# Patient Record
Sex: FEMALE | Race: BLACK OR AFRICAN AMERICAN | NOT HISPANIC OR LATINO | Employment: FULL TIME | URBAN - METROPOLITAN AREA
[De-identification: names, ages, dates, MRNs, and addresses within clinical notes are randomized per-mention and may not be internally consistent; named-entity substitution may affect disease eponyms.]

---

## 2023-12-14 ENCOUNTER — TRANSFERRED RECORDS (OUTPATIENT)
Dept: HEALTH INFORMATION MANAGEMENT | Facility: CLINIC | Age: 23
End: 2023-12-14

## 2024-02-14 ENCOUNTER — TELEPHONE (OUTPATIENT)
Dept: ENDOCRINOLOGY | Facility: CLINIC | Age: 24
End: 2024-02-14
Payer: COMMERCIAL

## 2024-02-14 DIAGNOSIS — R13.10 DYSPHAGIA: ICD-10-CM

## 2024-02-14 DIAGNOSIS — Z90.3 H/O GASTRIC SLEEVE: Primary | ICD-10-CM

## 2024-02-14 NOTE — TELEPHONE ENCOUNTER
General Call    Contacts         Type Contact Phone/Fax    02/14/2024 04:51 PM CST Phone (Incoming) Petty Hutchinson (Self) [phone] (H)          Reason for Call:  Pt has vertical surgery in [place] 12/17/2023 and is having issues - she wants a 2nd option    W  Okay to leave a detailed message?: Yes at Other phone number:  [phone]

## 2024-02-15 ENCOUNTER — TELEPHONE (OUTPATIENT)
Dept: GASTROENTEROLOGY | Facility: CLINIC | Age: 24
End: 2024-02-15
Payer: COMMERCIAL

## 2024-02-15 ENCOUNTER — TELEPHONE (OUTPATIENT)
Dept: ENDOCRINOLOGY | Facility: CLINIC | Age: 24
End: 2024-02-15
Payer: COMMERCIAL

## 2024-02-15 NOTE — TELEPHONE ENCOUNTER
"Endoscopy Scheduling Screen    Have you had a positive Covid test in the last 14 days?  No    Are you active on MyChart?   No    What insurance is in the chart?  Other:  HP    Ordering/Referring Provider: MADELINE GAN   (If ordering provider performs procedure, schedule with ordering provider unless otherwise instructed. )    BMI: Estimated body mass index is 29.96 kg/m  as calculated from the following:    Height as of 8/15/13: 1.633 m (5' 4.3\").    Weight as of 8/15/13: 79.9 kg (176 lb 3.2 oz).     Sedation Ordered  moderate sedation.   If patient BMI > 50 do not schedule in [facility].    If patient BMI > 45 do not schedule at [facility].    Are you taking methadone or Suboxone?  No    Have you had difficulties, pain, or discomfort during past endoscopy procedures?  No    Are you taking any prescription medications for pain 3 or more times per week?   NO - No RN review required.    Do you have a history of malignant hyperthermia or adverse reaction to anesthesia?  No    (Females) Are you currently pregnant?   No     Have you been diagnosed or told you have pulmonary hypertension?   No    Do you have an LVAD?  No    Have you been told you have moderate to severe sleep apnea?  No    Have you been told you have COPD, asthma, or any other lung disease?  No    Do you have any heart conditions?  No     Have you ever had an organ transplant?   No    Have you ever had or are you awaiting a heart or lung transplant?   No    Have you had a stroke or transient ischemic attack (TIA aka \"mini stroke\" in the last 6 months?   No    Have you been diagnosed with or been told you have cirrhosis of the liver?   No    Are you currently on dialysis?   No    Do you need assistance transferring?   No    BMI: Estimated body mass index is 29.96 kg/m  as calculated from the following:    Height as of 8/15/13: 1.633 m (5' 4.3\").    Weight as of 8/15/13: 79.9 kg (176 lb 3.2 oz).     Is patients BMI > 40 and scheduling location [facility]?  No    Do you " take an injectable medication for weight loss or diabetes (excluding insulin)?  No    Do you take the medication Naltrexone?  No    Do you take blood thinners?  No       Prep   Are you currently on dialysis or do you have chronic kidney disease?  No    Do you have a diagnosis of diabetes?  No    Do you have a diagnosis of cystic fibrosis (CF)?  No    On a regular basis do you go 3 -5 days between bowel movements?  No    BMI > 40?  No    Preferred Pharmacy:  No Pharmacies Listed    Final Scheduling Details   Colonoscopy prep sent?  N/A    Procedure scheduled  Upper endoscopy (EGD)    Surgeon:  ELVIA     Date of procedure:  2/16/2024     Pre-OP / PAC:   No - Not required for this site.    Location  [facility] - Per order.    Sedation   Moderate Sedation - Per order.      Patient Reminders:   You will receive a call from a Nurse to review instructions and health history.  This assessment must be completed prior to your procedure.  Failure to complete the Nurse assessment may result in the procedure being cancelled.      On the day of your procedure, please designate an adult(s) who can drive you home stay with you for the next 24 hours. The medicines used in the exam will make you sleepy. You will not be able to drive.      You cannot take public transportation, ride share services, or non-medical taxi service without a responsible caregiver.  Medical transport services are allowed with the requirement that a responsible caregiver will receive you at your destination.  We require that drivers and caregivers are confirmed prior to your procedure.

## 2024-02-15 NOTE — TELEPHONE ENCOUNTER
Dr Seals reviewed chart, patient agreeable to upper endoscopy with Dr Seals. Scot Ray will call to schedule. Patient verbalized understanding

## 2024-02-15 NOTE — TELEPHONE ENCOUNTER
Pre visit planning completed.      Procedure details:    Patient scheduled for Upper endoscopy (EGD) on 2/16/2024.     Arrival time: 1100. Procedure time 1200    Pre op exam needed? N/A    Facility location: [facility]; [address] [place] [address], [address]rd Floor, [place] [address]    Sedation type: Conscious sedation     Indication for procedure:   H/O gastric sleeve [Z90.3]  - Primary      Dysphagia [R13.10]            Chart review:     Electronic implanted devices? No    Recent diagnosis of diverticulitis within the last 6 weeks? N/A    Diabetic? No    Diabetic medication HOLDING recommendations: (if applicable)  Oral diabetic medications: N/A  Diabetic injectables: N/A  Insulin: N/A      Medication review:    Anticoagulants? No    NSAIDS? No NSAID medications per patient's medication list.  RN will verify with pre-assessment call.    Other medication HOLDING recommendations:  N/A      Prep for procedure:     Prep instructions was not sent due to pt does not have myChart and procedure is scheduled for tomorrow     Diana Hitchcock RN  Endoscopy Procedure Pre Assessment RN  [phone] option 4

## 2024-02-15 NOTE — TELEPHONE ENCOUNTER
Pre assessment completed for upcoming procedure.   (Please see previous telephone encounter notes for complete details)      Procedure details:    Arrival time and facility location reviewed.    Pre op exam needed? N/A    Designated  policy reviewed. Instructed to have someone stay 6  hours post procedure.     COVID policy reviewed.      Medication review:    Medications reviewed. Please see supporting documentation below. Holding recommendations discussed (if applicable).       Prep for procedure:     Procedure prep instructions reviewed.        Any additional information needed:  N/A      Patient  verbalized understanding and had no questions or concerns at this time.      Diana Hitchcock RN  Endoscopy Procedure Pre Assessment RN  [phone] option 4

## 2024-02-15 NOTE — TELEPHONE ENCOUNTER
Dr Seals reviewed patient's records and would like to do an endoscopy tomorrow between 12 and 1 p.m. Spoke with the manager of the Endoscopy Department, Marques, and he okayed this time. Called patient to let her know to arrive at 11 a.m. and also that someone from Endoscopy would be calling her to ask her some questions as well.

## 2024-02-16 ENCOUNTER — HOSPITAL ENCOUNTER (OUTPATIENT)
Facility: CLINIC | Age: 24
Discharge: HOME | End: 2024-02-16
Attending: SURGERY | Admitting: SURGERY
Payer: COMMERCIAL

## 2024-02-16 VITALS
OXYGEN SATURATION: 96 % | RESPIRATION RATE: 16 BRPM | HEART RATE: 91 BPM | DIASTOLIC BLOOD PRESSURE: 66 MMHG | SYSTOLIC BLOOD PRESSURE: 113 MMHG

## 2024-02-16 LAB — UPPER GI ENDOSCOPY: NORMAL

## 2024-02-16 PROCEDURE — 99153 MOD SED SAME PHYS/QHP EA: CPT | Performed by: SURGERY

## 2024-02-16 PROCEDURE — G0500 MOD SEDAT ENDO SERVICE >5YRS: HCPCS | Performed by: SURGERY

## 2024-02-16 PROCEDURE — 250N000011 HC RX IP 250 OP 636: Performed by: SURGERY

## 2024-02-16 PROCEDURE — 43220 ESOPHAGOSCOPY BALLOON <30MM: CPT | Performed by: SURGERY

## 2024-02-16 RX ORDER — FENTANYL CITRATE 50 UG/ML
INJECTION, SOLUTION INTRAMUSCULAR; INTRAVENOUS PRN
Status: DISCONTINUED | OUTPATIENT
Start: 2024-02-16 | End: 2024-02-16 | Stop reason: HOSPADM

## 2024-02-16 ASSESSMENT — ACTIVITIES OF DAILY LIVING (ADL)
ADLS_ACUITY_SCORE: 33
ADLS_ACUITY_SCORE: 35

## 2024-02-16 NOTE — OR NURSING
Pt tolerated EGD with balloon dilatation , very well, under conscious sedation. Pt came to endoscopy with a nasal feeding tube that remained in place when scope was removed, with the assist of a raptor grasping device.Pt was tachycardic, in the 140's, at the endof the procedure. Pt HR in the 110s when she was returned to the recovery area. Pt sent to recovery on RA

## 2024-02-19 ENCOUNTER — PATIENT OUTREACH (OUTPATIENT)
Dept: ENDOCRINOLOGY | Facility: CLINIC | Age: 24
End: 2024-02-19
Payer: COMMERCIAL

## 2024-02-19 NOTE — PROGRESS NOTES
Spoke with patient regarding EGD follow up. Patient states she is to have another EGD in 2 weeks. Dr Seals contacted to advise on follow up plan.

## 2024-02-20 ENCOUNTER — CARE COORDINATION (OUTPATIENT)
Dept: ENDOCRINOLOGY | Facility: CLINIC | Age: 24
End: 2024-02-20
Payer: COMMERCIAL

## 2024-02-20 DIAGNOSIS — R13.10 DYSPHAGIA: Primary | ICD-10-CM

## 2024-02-20 DIAGNOSIS — Z90.3 H/O GASTRIC SLEEVE: ICD-10-CM

## 2024-02-20 NOTE — PROGRESS NOTES
Patient gave e-mail to obtain OR report from [place]:  [email or web address]    Patient gave permission to obtain report for continuation of care.

## 2024-02-22 ENCOUNTER — TELEPHONE (OUTPATIENT)
Dept: GASTROENTEROLOGY | Facility: CLINIC | Age: 24
End: 2024-02-22
Payer: COMMERCIAL

## 2024-02-22 ENCOUNTER — TELEPHONE (OUTPATIENT)
Dept: ENDOCRINOLOGY | Facility: CLINIC | Age: 24
End: 2024-02-22
Payer: COMMERCIAL

## 2024-02-22 NOTE — TELEPHONE ENCOUNTER
Screening questions completed 2/15    Final Scheduling Details   Colonoscopy prep sent?  N/A    Procedure scheduled  Upper endoscopy (EGD)    Surgeon:  ELVIA     Date of procedure:  3/13/24     Pre-OP / PAC:   No - Not required for this site.    Location  [facility] - Per order.    Sedation   Moderate Sedation - Per order.      Patient Reminders:   You will receive a call from a Nurse to review instructions and health history.  This assessment must be completed prior to your procedure.  Failure to complete the Nurse assessment may result in the procedure being cancelled.      On the day of your procedure, please designate an adult(s) who can drive you home stay with you for the next 24 hours. The medicines used in the exam will make you sleepy. You will not be able to drive.      You cannot take public transportation, ride share services, or non-medical taxi service without a responsible caregiver.  Medical transport services are allowed with the requirement that a responsible caregiver will receive you at your destination.  We require that drivers and caregivers are confirmed prior to your procedure.

## 2024-02-22 NOTE — TELEPHONE ENCOUNTER
Left VM message asking patient to call me to discuss days for upper endoscopy with Dr. Seals. My direct call back number left. Last endoscopy 2/16 in [place] OR.

## 2024-03-01 ENCOUNTER — TELEPHONE (OUTPATIENT)
Dept: GASTROENTEROLOGY | Facility: CLINIC | Age: 24
End: 2024-03-01
Payer: COMMERCIAL

## 2024-03-01 NOTE — TELEPHONE ENCOUNTER
Attempted to contact patient in order to complete pre assessment questions.     No answer. Left message to return call to [phone] option 4    Missed call communication sent via letter.      Procedure details:    Patient scheduled for Upper endoscopy (EGD) on 3.13.24.     Arrival time: 1130. Procedure time 1230    Pre op exam needed? N/A    Facility location: [facility]; [address] [place] [address], [address] Floor, [place] [id number]    Sedation type: Conscious sedation     Indication for procedure:     gastric stricture         Chart review:     Electronic implanted devices? No    Recent diagnosis of diverticulitis within the last 6 weeks? N/A    Diabetic? No    Medication review:    Anticoagulants? No    NSAIDS? No    Other medication HOLDING recommendations:  N/A      Prep for procedure:     Prep instructions sent via letter.     Sylvie Felipe RN  Endoscopy Procedure Pre Assessment RN

## 2024-03-06 NOTE — TELEPHONE ENCOUNTER
Second call attempt to complete pre assessment.     No answer.  Left message to return call to [phone] #4 by next business day prior to 4PM or procedure will be sent to cancel.         Sylvie Felipe RN  Endoscopy Procedure Pre Assessment RN

## 2024-03-07 NOTE — TELEPHONE ENCOUNTER
Pre assessment completed for upcoming procedure.   (Please see previous telephone encounter notes for complete details)    Patient  returned call.       Procedure details:    Arrival time and facility location reviewed.    Pre op exam needed? N/A    Designated  policy reviewed. Instructed to have someone stay 6  hours post procedure.       Medication review:    Medications reviewed. Please see supporting documentation below. Holding recommendations discussed (if applicable).       Prep for procedure:     Procedure prep instructions reviewed.        Any additional information needed:  N/A      Patient  verbalized understanding and had no questions or concerns at this time.      Sylvie Felipe RN  Endoscopy Procedure Pre Assessment RN  [phone] option 4

## 2024-03-13 ENCOUNTER — HOSPITAL ENCOUNTER (OUTPATIENT)
Facility: CLINIC | Age: 24
Discharge: HOME | End: 2024-03-13
Attending: SURGERY | Admitting: SURGERY
Payer: COMMERCIAL

## 2024-03-13 VITALS
OXYGEN SATURATION: 96 % | DIASTOLIC BLOOD PRESSURE: 82 MMHG | SYSTOLIC BLOOD PRESSURE: 126 MMHG | HEART RATE: 89 BPM | RESPIRATION RATE: 18 BRPM

## 2024-03-13 LAB — UPPER GI ENDOSCOPY: NORMAL

## 2024-03-13 PROCEDURE — 43249 ESOPH EGD DILATION <30 MM: CPT | Performed by: SURGERY

## 2024-03-13 PROCEDURE — 250N000011 HC RX IP 250 OP 636: Performed by: SURGERY

## 2024-03-13 PROCEDURE — 250N000009 HC RX 250: Performed by: SURGERY

## 2024-03-13 PROCEDURE — G0500 MOD SEDAT ENDO SERVICE >5YRS: HCPCS | Performed by: SURGERY

## 2024-03-13 RX ORDER — FLUMAZENIL 0.1 MG/ML
0.2 INJECTION, SOLUTION INTRAVENOUS
Status: DISCONTINUED | OUTPATIENT
Start: 2024-03-13 | End: 2024-03-14 | Stop reason: HOSPADM

## 2024-03-13 RX ORDER — NALOXONE HYDROCHLORIDE 0.4 MG/ML
0.2 INJECTION, SOLUTION INTRAMUSCULAR; INTRAVENOUS; SUBCUTANEOUS
Status: DISCONTINUED | OUTPATIENT
Start: 2024-03-13 | End: 2024-03-18 | Stop reason: HOSPADM

## 2024-03-13 RX ORDER — NALOXONE HYDROCHLORIDE 0.4 MG/ML
0.4 INJECTION, SOLUTION INTRAMUSCULAR; INTRAVENOUS; SUBCUTANEOUS
Status: DISCONTINUED | OUTPATIENT
Start: 2024-03-13 | End: 2024-03-18 | Stop reason: HOSPADM

## 2024-03-13 RX ORDER — FENTANYL CITRATE 50 UG/ML
INJECTION, SOLUTION INTRAMUSCULAR; INTRAVENOUS PRN
Status: DISCONTINUED | OUTPATIENT
Start: 2024-03-13 | End: 2024-03-18 | Stop reason: HOSPADM

## 2024-03-13 RX ORDER — ONDANSETRON 2 MG/ML
4 INJECTION INTRAMUSCULAR; INTRAVENOUS EVERY 6 HOURS PRN
Status: DISCONTINUED | OUTPATIENT
Start: 2024-03-13 | End: 2024-03-18 | Stop reason: HOSPADM

## 2024-03-13 RX ORDER — ONDANSETRON 4 MG/1
4 TABLET, ORALLY DISINTEGRATING ORAL EVERY 6 HOURS PRN
Status: DISCONTINUED | OUTPATIENT
Start: 2024-03-13 | End: 2024-03-18 | Stop reason: HOSPADM

## 2024-03-13 RX ORDER — PROCHLORPERAZINE MALEATE 5 MG
10 TABLET ORAL EVERY 6 HOURS PRN
Status: DISCONTINUED | OUTPATIENT
Start: 2024-03-13 | End: 2024-03-18 | Stop reason: HOSPADM

## 2024-03-13 RX ADMIN — ONDANSETRON 4 MG: 2 INJECTION INTRAMUSCULAR; INTRAVENOUS at 13:21

## 2024-03-13 ASSESSMENT — ACTIVITIES OF DAILY LIVING (ADL)
ADLS_ACUITY_SCORE: 35

## 2024-03-13 NOTE — H&P
[facility] Anesthesia Pre-op History and Physical    Petty Hutchinson MRN# [id number]   Age: 23 year old YOB: 2000                    Location [facility] [place]; 3rd floor endoscopy center          Primary care provider: Marjorie Knowles         Chief Complaint and/or Reason for Procedure:   Has known sleeve stricture         Active problem list:     Patient Active Problem List    Diagnosis Date Noted    Obesity 08/16/2013     Priority: Medium            Medications (include herbals and vitamins):     No current facility-administered medications for this encounter.             Allergies:    No Known Allergies           Physical Exam:   Patient Vitals for the past 8 hrs:   BP Resp SpO2   03/13/24 1215 119/68 15 95 %     No intake/output data recorded.  Breathing unlabored  NAD            Lab / Radiology Results:   Guidelines for CXR: new or unstable cardio-pulmonary disease         Anesthetic risk and/or ASA classification:       Jerome Seals MD     Plan    Upper Endoscopy to evaluate for stricture; treat stricture      Jerome Seals MD  Surgery  [phone] (hospital )  [phone] (clinic nurses)

## 2024-03-13 NOTE — LETTER
[facility] ENDOSCOPY  [address] [facility] [address]  [phone]          March 1, 2024    Petty Hutchinson                                                                                                                     [address]  [facility] [address]            Dear Petty,        Petty,     We apologize that we have been unable to contact you by phone. We are calling in regards to your upcoming Upper endoscopy (EGD) procedure that is scheduled on 3.13.24 to complete pre assessment.    Please contact our pre assessment nursing team at [phone] option 4. We are open Monday through Friday, 7:00am to 5:00pm. Note that failure to complete Nurse assessment phone call will result in your procedure being cancelled.     Our schedules fill up quickly and are in high demand. If you know that you need to cancel, please contact us so that we can accommodate scheduling for other patients. Our endoscopy scheduling team can be reached at [phone] option 2.       Instructions for Your Upper Endoscopy  Pre-Assessment Phone Number: [facility]; [phone] option 4      You will receive a call from a Nurse to review instructions and health history.  This assessment must be completed prior to your procedure.  Failure to complete the Nurse assessment phone call may result in the procedure being cancelled.    On the day of your procedure, please designatean adult(s) who can drive you home stay with you for the next 24 hours. The medicines used in the exam will make you sleepy. You will not be able to drive.    You cannot take public transportation, ride share services, or non-medical taxi service without a responsible caregiver.  Medical transport services are allowed with the requirement that a responsible caregiver will receive you at your destination.  We require that drivers and caregivers are confirmed prior to your procedure.        What is an upper endoscopy?  - This is an  exam that checks for problems linked to heartburn, swallowing or belly (abdominal) pain or other symptoms of the upper GI tract. Depending on your symptoms, the doctor will look at your esophagus (food pipe), stomach and the part of the stomach that enters the small intestine.     Getting ready  - Dress in comfortable, loose clothing.  - Bring your insurance card. Leave your purse, billfold, credit cards and other valuables at home.  - Bring a list of your medicines and known allergies. If you have a pacemaker or ICD, please bring your information card.  - We do our best to stay on time, but there may be a delay. Please bring something to pass the time, such as a newspaper or book.    - Important: You must complete all steps before the exam.       Seven days before the exam   - Talk to your doctor: If you take blood-thinners (such as Coumadin, Plavix, Xarelto), your prescription or schedule may need to change before the test.  - Talk to your prescribing provider: If you take prescription NSAIDS (such as Sulindac, Celebrex, Mobic, Relafen). Your prescribing provider will tell you what to do.   - Continue taking prescribed aspirin; talk to your prescribing doctor with any concerns.    - If you have diabetes: Ask to have your exam early in the morning. Also, ask your doctor if you should change your diet or medicines    One day before the exam   - Stop eating all solid foods 8 hours prior to arrival time. You may drink clear liquids.   **If you have achalasia (treated or untreated) or gastroparesis, please be on a clear liquid diet for 24 hours prior to your exam and stop drinking all liquids at midnight.   - Stop taking sucralfate medication.  - Stop taking NSAID pain relievers, such as Advil, Ibuprofen, Motrin, etc.  You may take Tylenol.    Day of the exam  - You may drink clear liquids until 2 hours before your arrival time.  - You may take all of your morning medicines (except for diabetes pills) as usual with 4  oz. of water up to 2 hours before your arrival time.  - If you take diabetes medicine (pills): do not take them the morning of your test.  - Bring a list of your medicines and known allergies.  - Please do not wear jewelry (i.e. earrings, rings, necklaces, watches, etc) . Leave your purse, billfold, credit cards, and other valuables at home.   - Please arrive with an adult who can take you home after the test: The medicine will make you sleepy. If you do not have a , we may cancel your test.     What are clear liquids?   You may have:  - Water, tea, coffee (no cream)  - Soda pop, Gatorade   - Clear nutrition drinks (Enlive, Resource Breeze)   - Jell-O, Popsicles (no milk or fruit pieces) or sorbet   - Fat-free soup broth or bouillon  - Plain hard cand, such as clear life savers   - Clear juices and fruit-flavored drinks such as apple juice, white grape juice, Hi-C and Antonio-Aid    Do not have:  - Milk or milk products such as ice cream, malts or shakes  - Juices with pulp such as orange, grapefruit, pineapple or tomato juice  - Cream soups of any kind  - Alcohol       During the exam  - The exam lasts from 10 to 20 minutes.   - We will review the risks and benefits of the exam. We will then ask you to sign a consent form.  - We will place a small needle (IV) in your hand or arm. We will give you medicine through the IV to keep you comfortable.   - The endoscope will be advanced through your mouth and the exam completed.  - When we put air into your stomach, you may feel full or have mild cramps. We will remove the air at the end of the exam.  - To reduce discomfort, breathe at a slow, even pace. Try to relax the muscles in your neck and shoulders.  - We may take a small piece of tissue (a biopsy) to test in the lab. It will not hurt. We may also take pictures of your insides.     After the exam  - You will rest in the recovery area until you feel ready to leave. This takes about 30 to 60 minutes.   - We will  remove the IV.  - You may burp up air left in your stomach.  - You may feel drowsy or a little dizzy from the medicine.   - Your doctor will discuss your results. You will receive your test results in 7 to 10 business days by letter or MyChart.   - Your throat may feel numb. One hour after the exam, swallow a small amount of cool water. If you can swallow easily, you may go back to your regular diet and medicines.  - Your throat may be sore for the rest of the day. Throat lozenges or ice chips may help.  - Do not drive for 24 hours.    Call us at once if you have:  - Unusual pain or problems swallowing, unusual stomach or chest pain.  - Vomit that looks like coffee grounds or black or bloody stools (bowel movements).  - A fever above 100.6  F (37.5  C) when taken under the tongue.    Test results  - You will receive your results in 7 to 10 business days by phone, letter or MyChart.    For questions or appointments, call:  [facility] Endoscopy   [phone], option 2  Monday through Friday, 7 a.m. to 5 p.m.  (If it is after hours please reach out to the clinic or provider you were scheduled with.)    You should be aware that your endoscopist may be a part of a study to improve endoscopy procedures.  As part of a study, pictures gathered from your procedure may be stored and analyzed in a de-identified manner.

## 2024-03-13 NOTE — OR NURSING
P{t tolerated EGD with dilatation to 19, well, under conscious sedation. Pt SaO2 did drop to the mid 60's, very briefly, early in the procedure. Narcan was removed, not given. O2 increased to  6 lpm and pt had no further desaturation.  Pt using 2 lpm at the end of the procedure. Pt returned to recovery on Ra

## 2024-03-14 ENCOUNTER — PATIENT OUTREACH (OUTPATIENT)
Dept: ENDOCRINOLOGY | Facility: CLINIC | Age: 24
End: 2024-03-14
Payer: COMMERCIAL

## 2024-03-14 NOTE — PROGRESS NOTES
Checked in on patient post upper endoscopy to get status report.   Patient feels like her fluids are going down fine but when she ate ground turkey it got stuck, then went down.   Plan to have patient try to eat 2 meals today and drink 48 ounces of fluid and will check on her tomorrow.   Dr Seals updated on above.

## 2024-03-21 ENCOUNTER — TELEPHONE (OUTPATIENT)
Dept: SURGERY | Facility: CLINIC | Age: 24
End: 2024-03-21
Payer: COMMERCIAL

## 2024-03-21 DIAGNOSIS — R13.10 DYSPHAGIA, UNSPECIFIED TYPE: Primary | ICD-10-CM

## 2024-03-21 RX ORDER — CEFAZOLIN SODIUM 2 G/50ML
2 SOLUTION INTRAVENOUS SEE ADMIN INSTRUCTIONS
Status: CANCELLED | OUTPATIENT
Start: 2024-03-21

## 2024-03-21 RX ORDER — CEFAZOLIN SODIUM 2 G/50ML
2 SOLUTION INTRAVENOUS
Status: CANCELLED | OUTPATIENT
Start: 2024-03-21

## 2024-03-21 NOTE — TELEPHONE ENCOUNTER
Called patient to discuss stent placement on 3/26 at 11:50 a.m. at [facility] with Dr. Seals, to arrive at 9:50 a.m.    I did tell her I would talk with her next week after she has the stent placement to discuss date for stent removal.

## 2024-03-25 ENCOUNTER — TELEPHONE (OUTPATIENT)
Dept: ENDOCRINOLOGY | Facility: CLINIC | Age: 24
End: 2024-03-25
Payer: COMMERCIAL

## 2024-03-25 NOTE — TELEPHONE ENCOUNTER
Left VM message asking patient if she could come earlier tomorrow for her endoscopy as a patient fell off today. My direct call back number left.

## 2024-03-26 ENCOUNTER — ANESTHESIA (OUTPATIENT)
Dept: SURGERY | Facility: CLINIC | Age: 24
End: 2024-03-26
Payer: COMMERCIAL

## 2024-03-26 ENCOUNTER — ANESTHESIA EVENT (OUTPATIENT)
Dept: SURGERY | Facility: CLINIC | Age: 24
End: 2024-03-26
Payer: COMMERCIAL

## 2024-03-26 ENCOUNTER — HOSPITAL ENCOUNTER (OUTPATIENT)
Facility: CLINIC | Age: 24
Discharge: HOME | End: 2024-03-26
Attending: SURGERY | Admitting: SURGERY
Payer: COMMERCIAL

## 2024-03-26 VITALS
TEMPERATURE: 98 F | HEART RATE: 84 BPM | OXYGEN SATURATION: 99 % | DIASTOLIC BLOOD PRESSURE: 87 MMHG | RESPIRATION RATE: 18 BRPM | WEIGHT: 259.48 LBS | BODY MASS INDEX: 37.15 KG/M2 | HEIGHT: 70 IN | SYSTOLIC BLOOD PRESSURE: 143 MMHG

## 2024-03-26 DIAGNOSIS — R13.10 DYSPHAGIA, UNSPECIFIED TYPE: ICD-10-CM

## 2024-03-26 PROCEDURE — 710N000012 HC RECOVERY PHASE 2, PER MINUTE: Performed by: SURGERY

## 2024-03-26 PROCEDURE — 43266 EGD ENDOSCOPIC STENT PLACE: CPT | Performed by: ANESTHESIOLOGY

## 2024-03-26 PROCEDURE — 250N000009 HC RX 250

## 2024-03-26 PROCEDURE — 43266 EGD ENDOSCOPIC STENT PLACE: CPT

## 2024-03-26 PROCEDURE — 250N000025 HC SEVOFLURANE, PER MIN: Performed by: SURGERY

## 2024-03-26 PROCEDURE — 999N000141 HC STATISTIC PRE-PROCEDURE NURSING ASSESSMENT: Performed by: SURGERY

## 2024-03-26 PROCEDURE — C1874 STENT, COATED/COV W/DEL SYS: HCPCS | Performed by: SURGERY

## 2024-03-26 PROCEDURE — 258N000003 HC RX IP 258 OP 636

## 2024-03-26 PROCEDURE — 710N000010 HC RECOVERY PHASE 1, LEVEL 2, PER MIN: Performed by: SURGERY

## 2024-03-26 PROCEDURE — 250N000011 HC RX IP 250 OP 636: Performed by: ANESTHESIOLOGY

## 2024-03-26 PROCEDURE — 272N000001 HC OR GENERAL SUPPLY STERILE: Performed by: SURGERY

## 2024-03-26 PROCEDURE — 93005 ELECTROCARDIOGRAM TRACING: CPT

## 2024-03-26 PROCEDURE — 93010 ELECTROCARDIOGRAM REPORT: CPT | Performed by: INTERNAL MEDICINE

## 2024-03-26 PROCEDURE — 250N000013 HC RX MED GY IP 250 OP 250 PS 637: Performed by: ANESTHESIOLOGY

## 2024-03-26 PROCEDURE — 250N000011 HC RX IP 250 OP 636

## 2024-03-26 PROCEDURE — 370N000017 HC ANESTHESIA TECHNICAL FEE, PER MIN: Performed by: SURGERY

## 2024-03-26 PROCEDURE — C1769 GUIDE WIRE: HCPCS | Performed by: SURGERY

## 2024-03-26 PROCEDURE — 43266 EGD ENDOSCOPIC STENT PLACE: CPT | Performed by: SURGERY

## 2024-03-26 PROCEDURE — 360N000075 HC SURGERY LEVEL 2, PER MIN: Performed by: SURGERY

## 2024-03-26 DEVICE — STENT ESOPHAGEAL ENDOMAXX 23X150MM MAXX-2315
Type: IMPLANTABLE DEVICE | Site: STOMACH | Status: NON-FUNCTIONAL
Removed: 2024-03-29

## 2024-03-26 DEVICE — STENT ESOPHAGEAL ENDOMAXX 23X100MM MAXX-2310
Type: IMPLANTABLE DEVICE | Site: STOMACH | Status: NON-FUNCTIONAL
Removed: 2024-03-29

## 2024-03-26 RX ORDER — DEXAMETHASONE SODIUM PHOSPHATE 4 MG/ML
INJECTION, SOLUTION INTRA-ARTICULAR; INTRALESIONAL; INTRAMUSCULAR; INTRAVENOUS; SOFT TISSUE PRN
Status: DISCONTINUED | OUTPATIENT
Start: 2024-03-26 | End: 2024-03-26

## 2024-03-26 RX ORDER — OXYCODONE HYDROCHLORIDE 5 MG/1
5 TABLET ORAL
Status: DISCONTINUED | OUTPATIENT
Start: 2024-03-26 | End: 2024-03-26 | Stop reason: HOSPADM

## 2024-03-26 RX ORDER — MAGNESIUM HYDROXIDE/ALUMINUM HYDROXICE/SIMETHICONE 120; 1200; 1200 MG/30ML; MG/30ML; MG/30ML
30 SUSPENSION ORAL EVERY 4 HOURS PRN
Status: DISCONTINUED | OUTPATIENT
Start: 2024-03-26 | End: 2024-03-26 | Stop reason: HOSPADM

## 2024-03-26 RX ORDER — NALOXONE HYDROCHLORIDE 0.4 MG/ML
0.1 INJECTION, SOLUTION INTRAMUSCULAR; INTRAVENOUS; SUBCUTANEOUS
Status: DISCONTINUED | OUTPATIENT
Start: 2024-03-26 | End: 2024-03-26 | Stop reason: HOSPADM

## 2024-03-26 RX ORDER — HYDROMORPHONE HCL IN WATER/PF 6 MG/30 ML
0.2 PATIENT CONTROLLED ANALGESIA SYRINGE INTRAVENOUS EVERY 5 MIN PRN
Status: DISCONTINUED | OUTPATIENT
Start: 2024-03-26 | End: 2024-03-26 | Stop reason: HOSPADM

## 2024-03-26 RX ORDER — ONDANSETRON 4 MG/1
4 TABLET, ORALLY DISINTEGRATING ORAL EVERY 30 MIN PRN
Status: DISCONTINUED | OUTPATIENT
Start: 2024-03-26 | End: 2024-03-26 | Stop reason: HOSPADM

## 2024-03-26 RX ORDER — SODIUM CHLORIDE, SODIUM LACTATE, POTASSIUM CHLORIDE, CALCIUM CHLORIDE 600; 310; 30; 20 MG/100ML; MG/100ML; MG/100ML; MG/100ML
INJECTION, SOLUTION INTRAVENOUS CONTINUOUS
Status: DISCONTINUED | OUTPATIENT
Start: 2024-03-26 | End: 2024-03-26 | Stop reason: HOSPADM

## 2024-03-26 RX ORDER — LIDOCAINE HYDROCHLORIDE 20 MG/ML
INJECTION, SOLUTION INFILTRATION; PERINEURAL PRN
Status: DISCONTINUED | OUTPATIENT
Start: 2024-03-26 | End: 2024-03-26

## 2024-03-26 RX ORDER — HYDROMORPHONE HCL IN WATER/PF 6 MG/30 ML
0.4 PATIENT CONTROLLED ANALGESIA SYRINGE INTRAVENOUS EVERY 5 MIN PRN
Status: DISCONTINUED | OUTPATIENT
Start: 2024-03-26 | End: 2024-03-26 | Stop reason: HOSPADM

## 2024-03-26 RX ORDER — ONDANSETRON 2 MG/ML
INJECTION INTRAMUSCULAR; INTRAVENOUS PRN
Status: DISCONTINUED | OUTPATIENT
Start: 2024-03-26 | End: 2024-03-26

## 2024-03-26 RX ORDER — FENTANYL CITRATE 50 UG/ML
INJECTION, SOLUTION INTRAMUSCULAR; INTRAVENOUS PRN
Status: DISCONTINUED | OUTPATIENT
Start: 2024-03-26 | End: 2024-03-26

## 2024-03-26 RX ORDER — CEFAZOLIN SODIUM/WATER 2 G/20 ML
2 SYRINGE (ML) INTRAVENOUS
Status: DISCONTINUED | OUTPATIENT
Start: 2024-03-26 | End: 2024-03-26 | Stop reason: HOSPADM

## 2024-03-26 RX ORDER — CEFAZOLIN SODIUM/WATER 2 G/20 ML
2 SYRINGE (ML) INTRAVENOUS SEE ADMIN INSTRUCTIONS
Status: DISCONTINUED | OUTPATIENT
Start: 2024-03-26 | End: 2024-03-26 | Stop reason: HOSPADM

## 2024-03-26 RX ORDER — OXYCODONE HYDROCHLORIDE 10 MG/1
10 TABLET ORAL
Status: DISCONTINUED | OUTPATIENT
Start: 2024-03-26 | End: 2024-03-26 | Stop reason: HOSPADM

## 2024-03-26 RX ORDER — SODIUM CHLORIDE, SODIUM LACTATE, POTASSIUM CHLORIDE, CALCIUM CHLORIDE 600; 310; 30; 20 MG/100ML; MG/100ML; MG/100ML; MG/100ML
INJECTION, SOLUTION INTRAVENOUS CONTINUOUS PRN
Status: DISCONTINUED | OUTPATIENT
Start: 2024-03-26 | End: 2024-03-26

## 2024-03-26 RX ORDER — ONDANSETRON 2 MG/ML
4 INJECTION INTRAMUSCULAR; INTRAVENOUS EVERY 30 MIN PRN
Status: DISCONTINUED | OUTPATIENT
Start: 2024-03-26 | End: 2024-03-26 | Stop reason: HOSPADM

## 2024-03-26 RX ORDER — FENTANYL CITRATE 50 UG/ML
25 INJECTION, SOLUTION INTRAMUSCULAR; INTRAVENOUS EVERY 5 MIN PRN
Status: DISCONTINUED | OUTPATIENT
Start: 2024-03-26 | End: 2024-03-26 | Stop reason: HOSPADM

## 2024-03-26 RX ORDER — PROPOFOL 10 MG/ML
INJECTION, EMULSION INTRAVENOUS PRN
Status: DISCONTINUED | OUTPATIENT
Start: 2024-03-26 | End: 2024-03-26

## 2024-03-26 RX ORDER — LIDOCAINE 40 MG/G
CREAM TOPICAL
Status: DISCONTINUED | OUTPATIENT
Start: 2024-03-26 | End: 2024-03-26 | Stop reason: HOSPADM

## 2024-03-26 RX ORDER — FENTANYL CITRATE 50 UG/ML
50 INJECTION, SOLUTION INTRAMUSCULAR; INTRAVENOUS EVERY 5 MIN PRN
Status: DISCONTINUED | OUTPATIENT
Start: 2024-03-26 | End: 2024-03-26 | Stop reason: HOSPADM

## 2024-03-26 RX ADMIN — PHENYLEPHRINE HYDROCHLORIDE 100 MCG: 10 INJECTION INTRAVENOUS at 13:36

## 2024-03-26 RX ADMIN — SODIUM CHLORIDE, POTASSIUM CHLORIDE, SODIUM LACTATE AND CALCIUM CHLORIDE: 600; 310; 30; 20 INJECTION, SOLUTION INTRAVENOUS at 13:14

## 2024-03-26 RX ADMIN — SUGAMMADEX 200 MG: 100 INJECTION, SOLUTION INTRAVENOUS at 13:54

## 2024-03-26 RX ADMIN — PROCHLORPERAZINE EDISYLATE 5 MG: 5 INJECTION INTRAMUSCULAR; INTRAVENOUS at 14:49

## 2024-03-26 RX ADMIN — FENTANYL CITRATE 100 MCG: 50 INJECTION INTRAMUSCULAR; INTRAVENOUS at 13:21

## 2024-03-26 RX ADMIN — ONDANSETRON 4 MG: 2 INJECTION INTRAMUSCULAR; INTRAVENOUS at 14:30

## 2024-03-26 RX ADMIN — PROPOFOL 200 MG: 10 INJECTION, EMULSION INTRAVENOUS at 13:21

## 2024-03-26 RX ADMIN — LIDOCAINE HYDROCHLORIDE 100 MG: 20 INJECTION, SOLUTION INFILTRATION; PERINEURAL at 13:21

## 2024-03-26 RX ADMIN — ALUMINUM HYDROXIDE, MAGNESIUM HYDROXIDE, AND SIMETHICONE 30 ML: 1200; 120; 1200 SUSPENSION ORAL at 15:47

## 2024-03-26 RX ADMIN — HYDROMORPHONE HYDROCHLORIDE 0.4 MG: 0.2 INJECTION, SOLUTION INTRAMUSCULAR; INTRAVENOUS; SUBCUTANEOUS at 15:04

## 2024-03-26 RX ADMIN — SUGAMMADEX 100 MG: 100 INJECTION, SOLUTION INTRAVENOUS at 14:03

## 2024-03-26 RX ADMIN — DEXAMETHASONE SODIUM PHOSPHATE 4 MG: 4 INJECTION, SOLUTION INTRA-ARTICULAR; INTRALESIONAL; INTRAMUSCULAR; INTRAVENOUS; SOFT TISSUE at 13:27

## 2024-03-26 RX ADMIN — FENTANYL CITRATE 50 MCG: 50 INJECTION, SOLUTION INTRAMUSCULAR; INTRAVENOUS at 14:56

## 2024-03-26 RX ADMIN — Medication 50 MG: at 13:22

## 2024-03-26 RX ADMIN — MIDAZOLAM 2 MG: 1 INJECTION INTRAMUSCULAR; INTRAVENOUS at 13:13

## 2024-03-26 RX ADMIN — FENTANYL CITRATE 50 MCG: 50 INJECTION, SOLUTION INTRAMUSCULAR; INTRAVENOUS at 14:46

## 2024-03-26 RX ADMIN — ONDANSETRON 4 MG: 2 INJECTION INTRAMUSCULAR; INTRAVENOUS at 13:27

## 2024-03-26 ASSESSMENT — ACTIVITIES OF DAILY LIVING (ADL)
ADLS_ACUITY_SCORE: 33
ADLS_ACUITY_SCORE: 35

## 2024-03-26 NOTE — ANESTHESIA PROCEDURE NOTES
Airway       Patient location during procedure: OR       Procedure Start/Stop Times: 3/26/2024 1:24 PM  Staff -        CRNA: Jonathan Rascon APRN CRNA       Performed By: CRNA  Consent for Airway        Urgency: elective  Indications and Patient Condition       Indications for airway management: anand-procedural       Induction type:intravenous       Mask difficulty assessment: 1 - vent by mask    Final Airway Details       Final airway type: endotracheal airway       Successful airway: ETT - single  Endotracheal Airway Details        Cuffed: yes       Successful intubation technique: direct laryngoscopy       DL Blade Type: Goldman 2       Grade View of Cords: 1       Adjucts: stylet       Position: Right       Measured from: gums/teeth       Secured at (cm): 21       Bite block used: None    Post intubation assessment        Placement verified by: capnometry, equal breath sounds and chest rise        Number of attempts at approach: 1       Number of other approaches attempted: 0       Secured with: pink tape       Ease of procedure: easy       Dentition: Intact and Unchanged    Medication(s) Administered   Medication Administration Time: 3/26/2024 1:24 PM

## 2024-03-26 NOTE — ANESTHESIA CARE TRANSFER NOTE
Patient: Petty Hutchinson    Procedure: Procedure(s):  ESOPHAGOGASTRODUODENOSCOPY, WITH WIRE-GUIDED GASTRIC STENT PLACEMENT; [id number] DISTAL; [id number] PROXIMAL       Diagnosis: Dysphagia, unspecified type [R13.10]  Diagnosis Additional Information: No value filed.    Anesthesia Type:   General     Note:    Oropharynx: oropharynx clear of all foreign objects and spontaneously breathing  Level of Consciousness: awake  Oxygen Supplementation: face mask  Level of Supplemental Oxygen (L/min / FiO2): 6  Independent Airway: airway patency satisfactory and stable  Dentition: dentition unchanged  Vital Signs Stable: post-procedure vital signs reviewed and stable  Report to RN Given: handoff report given  Patient transferred to: PACU    Handoff Report: Identifed the Patient, Identified the Reponsible Provider, Reviewed the pertinent medical history, Discussed the surgical course, Reviewed Intra-OP anesthesia mangement and issues during anesthesia, Set expectations for post-procedure period and Allowed opportunity for questions and acknowledgement of understanding    Vitals:  Vitals Value Taken Time   /81 03/26/24 1415   Temp     Pulse 110 03/26/24 1417   Resp 21 03/26/24 1417   SpO2 100 % 03/26/24 1417   Vitals shown include unfiled device data.    Electronically Signed By: TOMY Wakefield CRNA  March 26, 2024  2:18 PM

## 2024-03-26 NOTE — ANESTHESIA PREPROCEDURE EVALUATION
"Anesthesia Pre-Procedure Evaluation    Patient: Petty Hutchinson   MRN: [id number] : 2000        Procedure : Procedure(s):  ESOPHAGOGASTRODUODENOSCOPY, WITH ESOPHAGEAL STENT REPLACEMENT          No past medical history on file.   No past surgical history on file.   No Known Allergies   Social History     Tobacco Use    Smoking status: Never    Smokeless tobacco: Not on file   Substance Use Topics    Alcohol use: No      Wt Readings from Last 1 Encounters:   08/15/13 79.9 kg (176 lb 3.2 oz) (>99%, Z= 2.43)*     * Growth percentiles are based on CDC (Girls, 2-20 Years) data.        Anesthesia Evaluation            ROS/MED HX  ENT/Pulmonary:  - neg pulmonary ROS     Neurologic:  - neg neurologic ROS     Cardiovascular:  - neg cardiovascular ROS     METS/Exercise Tolerance:     Hematologic:  - neg hematologic  ROS     Musculoskeletal:       GI/Hepatic: Comment: dysphagia      Renal/Genitourinary:  - neg Renal ROS     Endo:  - neg endo ROS   (+)               Obesity,       Psychiatric/Substance Use:  - neg psychiatric ROS     Infectious Disease:  - neg infectious disease ROS     Malignancy:  - neg malignancy ROS     Other:            Physical Exam    Airway             Respiratory Devices and Support         Dental       (+) Completely normal teeth      Cardiovascular          Rhythm and rate: regular and normal     Pulmonary           breath sounds clear to auscultation           OUTSIDE LABS:  CBC: No results found for: \"WBC\", \"HGB\", \"HCT\", \"PLT\"  BMP: No results found for: \"NA\", \"POTASSIUM\", \"CHLORIDE\", \"CO2\", \"BUN\", \"CR\", \"GLC\"  COAGS: No results found for: \"PTT\", \"INR\", \"FIBR\"  POC: No results found for: \"BGM\", \"HCG\", \"HCGS\"  HEPATIC: No results found for: \"ALBUMIN\", \"PROTTOTAL\", \"ALT\", \"AST\", \"GGT\", \"ALKPHOS\", \"BILITOTAL\", \"BILIDIRECT\", \"MARLA\"  OTHER: No results found for: \"PH\", \"LACT\", \"A1C\", \"JESE\", \"PHOS\", \"MAG\", \"LIPASE\", \"AMYLASE\", \"TSH\", \"T4\", \"T3\", \"CRP\", \"SED\"    Anesthesia Plan    ASA Status:  2  "   NPO Status:  NPO Appropriate    Anesthesia Type: General.     - Airway: ETT   Induction: Intravenous.   Maintenance: Balanced.        Consents    Anesthesia Plan(s) and associated risks, benefits, and realistic alternatives discussed. Questions answered and patient/representative(s) expressed understanding.     - Discussed:     - Discussed with:  Patient      - Extended Intubation/Ventilatory Support Discussed: No.      - Patient is DNR/DNI Status: No     Use of blood products discussed: No .     Postoperative Care    Pain management: IV analgesics, Multi-modal analgesia, Oral pain medications.   PONV prophylaxis: Ondansetron (or other 5HT-3), Dexamethasone or Solumedrol     Comments:               Aman Fermin MD    I have reviewed the pertinent notes and labs in the chart from the past 30 days and (re)examined the patient.  Any updates or changes from those notes are reflected in this note.

## 2024-03-26 NOTE — BRIEF OP NOTE
[facility]    Brief Operative Note    Pre-operative diagnosis: Dysphagia, unspecified type [R13.10]  Post-operative diagnosis Same as pre-operative diagnosis    Procedure: ESOPHAGOGASTRODUODENOSCOPY, WITH WIRE-GUIDED GASTRIC STENT PLACEMENT; [id number] DISTAL; [id number] PROXIMAL, N/A - Esophagus    Surgeon: Surgeon(s) and Role:     * Jerome Seals MD - Primary  Anesthesia: General   Estimated Blood Loss: Minimal    Drains: None  Specimens: * No specimens in log *  Findings:   Please see dictated operative report.  Complications: Please see dictated operative report.  Implants:   Implant Name Type Inv. Item Serial No.  Lot No. LRB No. Used Action   STENT ESOPHAGEAL ENDOMAXX [id number] BHAVESH-2315 - [id number] Stent STENT ESOPHAGEAL ENDOMAXX [id number] BHAVESH-2315  [employer] [id number] N/A 1 Implanted   STENT ESOPHAGEAL ENDOMAXX [id number] BHAVESH-2310 - [id number] Stent STENT ESOPHAGEAL ENDOMAXX [id number] BHAVESH-2310  [employer] [id number] N/A 1 Implanted         Zac Velasquez MD  Urology PGY-1

## 2024-03-26 NOTE — DISCHARGE INSTRUCTIONS
Contacting your Doctor -   To contact a doctor, call Dr Seals's clinic at [phone]  or:  [phone] and ask for the resident on call for Surgery (answered 24 hours a day)   Emergency Department:  [facility]: [phone]  [facility]: [phone] if you are in need of immediate or emergent help

## 2024-03-26 NOTE — ANESTHESIA POSTPROCEDURE EVALUATION
Patient: Petty Hutchinson    Procedure: Procedure(s):  ESOPHAGOGASTRODUODENOSCOPY, WITH WIRE-GUIDED GASTRIC STENT PLACEMENT; [id number] DISTAL; [id number] PROXIMAL       Anesthesia Type:  General    Note:  Anesthesia Post Evaluation    Last vitals:  Vitals Value Taken Time   /81 03/26/24 1415   Temp     Pulse 100 03/26/24 1450   Resp 20 03/26/24 1422   SpO2 100 % 03/26/24 1450   Vitals shown include unfiled device data.    Electronically Signed By: Aman Fermin MD  March 26, 2024  2:51 PM

## 2024-03-27 ENCOUNTER — APPOINTMENT (OUTPATIENT)
Dept: GENERAL RADIOLOGY | Facility: CLINIC | Age: 24
End: 2024-03-27
Attending: EMERGENCY MEDICINE
Payer: COMMERCIAL

## 2024-03-27 ENCOUNTER — HOSPITAL ENCOUNTER (OUTPATIENT)
Facility: CLINIC | Age: 24
Setting detail: OBSERVATION
Discharge: HOME | End: 2024-03-27
Attending: EMERGENCY MEDICINE | Admitting: SURGERY
Payer: COMMERCIAL

## 2024-03-27 ENCOUNTER — TELEPHONE (OUTPATIENT)
Dept: ENDOCRINOLOGY | Facility: CLINIC | Age: 24
End: 2024-03-27

## 2024-03-27 ENCOUNTER — APPOINTMENT (OUTPATIENT)
Dept: CT IMAGING | Facility: CLINIC | Age: 24
End: 2024-03-27
Attending: EMERGENCY MEDICINE
Payer: COMMERCIAL

## 2024-03-27 VITALS
OXYGEN SATURATION: 100 % | RESPIRATION RATE: 18 BRPM | SYSTOLIC BLOOD PRESSURE: 133 MMHG | TEMPERATURE: 98 F | DIASTOLIC BLOOD PRESSURE: 77 MMHG | HEART RATE: 77 BPM

## 2024-03-27 DIAGNOSIS — R10.13 EPIGASTRIC PAIN: Primary | ICD-10-CM

## 2024-03-27 DIAGNOSIS — G89.18 POST-OP PAIN: ICD-10-CM

## 2024-03-27 PROBLEM — R10.9 ABDOMINAL PAIN: Status: ACTIVE | Noted: 2024-03-27

## 2024-03-27 LAB
ABO/RH(D): NORMAL
ALBUMIN SERPL BCG-MCNC: 4.5 G/DL (ref 3.5–5.2)
ALP SERPL-CCNC: 88 U/L (ref 40–150)
ALT SERPL W P-5'-P-CCNC: 22 U/L (ref 0–50)
ANION GAP SERPL CALCULATED.3IONS-SCNC: 14 MMOL/L (ref 7–15)
ANTIBODY SCREEN: NEGATIVE
AST SERPL W P-5'-P-CCNC: 21 U/L (ref 0–45)
ATRIAL RATE - MUSE: 59 BPM
ATRIAL RATE - MUSE: 92 BPM
BASOPHILS # BLD AUTO: 0 10E3/UL (ref 0–0.2)
BASOPHILS NFR BLD AUTO: 0 %
BILIRUB SERPL-MCNC: 0.4 MG/DL
BUN SERPL-MCNC: 4.3 MG/DL (ref 6–20)
CALCIUM SERPL-MCNC: 9.8 MG/DL (ref 8.6–10)
CHLORIDE SERPL-SCNC: 103 MMOL/L (ref 98–107)
CREAT SERPL-MCNC: 0.75 MG/DL (ref 0.51–0.95)
DEPRECATED HCO3 PLAS-SCNC: 23 MMOL/L (ref 22–29)
DIASTOLIC BLOOD PRESSURE - MUSE: NORMAL MMHG
DIASTOLIC BLOOD PRESSURE - MUSE: NORMAL MMHG
EGFRCR SERPLBLD CKD-EPI 2021: >90 ML/MIN/1.73M2
EOSINOPHIL # BLD AUTO: 0 10E3/UL (ref 0–0.7)
EOSINOPHIL NFR BLD AUTO: 0 %
ERYTHROCYTE [DISTWIDTH] IN BLOOD BY AUTOMATED COUNT: 18.5 % (ref 10–15)
GLUCOSE BLDC GLUCOMTR-MCNC: 107 MG/DL (ref 70–99)
GLUCOSE SERPL-MCNC: 112 MG/DL (ref 70–99)
HCG SERPL QL: NEGATIVE
HCT VFR BLD AUTO: 40.3 % (ref 35–47)
HGB BLD-MCNC: 12.6 G/DL (ref 11.7–15.7)
HOLD SPECIMEN: NORMAL
IMM GRANULOCYTES # BLD: 0 10E3/UL
IMM GRANULOCYTES NFR BLD: 0 %
INTERPRETATION ECG - MUSE: NORMAL
INTERPRETATION ECG - MUSE: NORMAL
LYMPHOCYTES # BLD AUTO: 1.3 10E3/UL (ref 0.8–5.3)
LYMPHOCYTES NFR BLD AUTO: 12 %
MCH RBC QN AUTO: 25.5 PG (ref 26.5–33)
MCHC RBC AUTO-ENTMCNC: 31.3 G/DL (ref 31.5–36.5)
MCV RBC AUTO: 81 FL (ref 78–100)
MONOCYTES # BLD AUTO: 0.6 10E3/UL (ref 0–1.3)
MONOCYTES NFR BLD AUTO: 6 %
NEUTROPHILS # BLD AUTO: 8.9 10E3/UL (ref 1.6–8.3)
NEUTROPHILS NFR BLD AUTO: 82 %
NRBC # BLD AUTO: 0 10E3/UL
NRBC BLD AUTO-RTO: 0 /100
P AXIS - MUSE: 43 DEGREES
P AXIS - MUSE: 85 DEGREES
PLATELET # BLD AUTO: 308 10E3/UL (ref 150–450)
POTASSIUM SERPL-SCNC: 3.4 MMOL/L (ref 3.4–5.3)
PR INTERVAL - MUSE: 162 MS
PR INTERVAL - MUSE: 168 MS
PROT SERPL-MCNC: 8 G/DL (ref 6.4–8.3)
QRS DURATION - MUSE: 86 MS
QRS DURATION - MUSE: 90 MS
QT - MUSE: 414 MS
QT - MUSE: 422 MS
QTC - MUSE: 409 MS
QTC - MUSE: 521 MS
R AXIS - MUSE: 39 DEGREES
R AXIS - MUSE: 81 DEGREES
RBC # BLD AUTO: 4.95 10E6/UL (ref 3.8–5.2)
SODIUM SERPL-SCNC: 140 MMOL/L (ref 135–145)
SPECIMEN EXPIRATION DATE: NORMAL
SYSTOLIC BLOOD PRESSURE - MUSE: NORMAL MMHG
SYSTOLIC BLOOD PRESSURE - MUSE: NORMAL MMHG
T AXIS - MUSE: 164 DEGREES
T AXIS - MUSE: 21 DEGREES
VENTRICULAR RATE- MUSE: 59 BPM
VENTRICULAR RATE- MUSE: 92 BPM
WBC # BLD AUTO: 10.9 10E3/UL (ref 4–11)

## 2024-03-27 PROCEDURE — 86900 BLOOD TYPING SEROLOGIC ABO: CPT | Performed by: EMERGENCY MEDICINE

## 2024-03-27 PROCEDURE — 99285 EMERGENCY DEPT VISIT HI MDM: CPT | Mod: 25 | Performed by: EMERGENCY MEDICINE

## 2024-03-27 PROCEDURE — 36415 COLL VENOUS BLD VENIPUNCTURE: CPT | Performed by: EMERGENCY MEDICINE

## 2024-03-27 PROCEDURE — 71046 X-RAY EXAM CHEST 2 VIEWS: CPT | Mod: 26 | Performed by: RADIOLOGY

## 2024-03-27 PROCEDURE — 250N000013 HC RX MED GY IP 250 OP 250 PS 637

## 2024-03-27 PROCEDURE — 250N000011 HC RX IP 250 OP 636: Performed by: EMERGENCY MEDICINE

## 2024-03-27 PROCEDURE — 258N000003 HC RX IP 258 OP 636

## 2024-03-27 PROCEDURE — 80053 COMPREHEN METABOLIC PANEL: CPT | Performed by: EMERGENCY MEDICINE

## 2024-03-27 PROCEDURE — 96375 TX/PRO/DX INJ NEW DRUG ADDON: CPT | Mod: 59

## 2024-03-27 PROCEDURE — 258N000003 HC RX IP 258 OP 636: Performed by: EMERGENCY MEDICINE

## 2024-03-27 PROCEDURE — 82962 GLUCOSE BLOOD TEST: CPT

## 2024-03-27 PROCEDURE — 84703 CHORIONIC GONADOTROPIN ASSAY: CPT | Performed by: EMERGENCY MEDICINE

## 2024-03-27 PROCEDURE — G0378 HOSPITAL OBSERVATION PER HR: HCPCS

## 2024-03-27 PROCEDURE — 71260 CT THORAX DX C+: CPT

## 2024-03-27 PROCEDURE — 96376 TX/PRO/DX INJ SAME DRUG ADON: CPT | Mod: 59

## 2024-03-27 PROCEDURE — 71260 CT THORAX DX C+: CPT | Mod: 26 | Performed by: RADIOLOGY

## 2024-03-27 PROCEDURE — 93005 ELECTROCARDIOGRAM TRACING: CPT | Performed by: EMERGENCY MEDICINE

## 2024-03-27 PROCEDURE — 71046 X-RAY EXAM CHEST 2 VIEWS: CPT

## 2024-03-27 PROCEDURE — 250N000011 HC RX IP 250 OP 636

## 2024-03-27 PROCEDURE — 74177 CT ABD & PELVIS W/CONTRAST: CPT | Mod: 26 | Performed by: RADIOLOGY

## 2024-03-27 PROCEDURE — 85025 COMPLETE CBC W/AUTO DIFF WBC: CPT | Performed by: EMERGENCY MEDICINE

## 2024-03-27 PROCEDURE — 96374 THER/PROPH/DIAG INJ IV PUSH: CPT | Mod: 59 | Performed by: EMERGENCY MEDICINE

## 2024-03-27 PROCEDURE — 96361 HYDRATE IV INFUSION ADD-ON: CPT

## 2024-03-27 RX ORDER — ACETAMINOPHEN 325 MG/1
650 TABLET ORAL EVERY 4 HOURS
Qty: 90 TABLET | Refills: 0 | Status: SHIPPED | OUTPATIENT
Start: 2024-03-27

## 2024-03-27 RX ORDER — AMOXICILLIN 250 MG
2 CAPSULE ORAL 2 TIMES DAILY
Status: DISCONTINUED | OUTPATIENT
Start: 2024-03-27 | End: 2024-03-27 | Stop reason: HOSPADM

## 2024-03-27 RX ORDER — ONDANSETRON 2 MG/ML
4 INJECTION INTRAMUSCULAR; INTRAVENOUS EVERY 6 HOURS PRN
Status: DISCONTINUED | OUTPATIENT
Start: 2024-03-27 | End: 2024-03-27 | Stop reason: HOSPADM

## 2024-03-27 RX ORDER — AMOXICILLIN 250 MG
2 CAPSULE ORAL 2 TIMES DAILY PRN
Status: DISCONTINUED | OUTPATIENT
Start: 2024-03-27 | End: 2024-03-27 | Stop reason: HOSPADM

## 2024-03-27 RX ORDER — AMOXICILLIN 250 MG
1 CAPSULE ORAL 2 TIMES DAILY PRN
Status: DISCONTINUED | OUTPATIENT
Start: 2024-03-27 | End: 2024-03-27 | Stop reason: HOSPADM

## 2024-03-27 RX ORDER — ONDANSETRON 2 MG/ML
4 INJECTION INTRAMUSCULAR; INTRAVENOUS
Status: COMPLETED | OUTPATIENT
Start: 2024-03-27 | End: 2024-03-27

## 2024-03-27 RX ORDER — ONDANSETRON 4 MG/1
4 TABLET, ORALLY DISINTEGRATING ORAL EVERY 6 HOURS PRN
Qty: 28 TABLET | Refills: 0 | Status: SHIPPED | OUTPATIENT
Start: 2024-03-27

## 2024-03-27 RX ORDER — HYDROMORPHONE HCL IN WATER/PF 6 MG/30 ML
0.4 PATIENT CONTROLLED ANALGESIA SYRINGE INTRAVENOUS
Status: DISCONTINUED | OUTPATIENT
Start: 2024-03-27 | End: 2024-03-27 | Stop reason: HOSPADM

## 2024-03-27 RX ORDER — HYDROMORPHONE HYDROCHLORIDE 2 MG/1
2 TABLET ORAL EVERY 6 HOURS PRN
Qty: 20 TABLET | Refills: 0 | Status: SHIPPED | OUTPATIENT
Start: 2024-03-27 | End: 2024-04-01

## 2024-03-27 RX ORDER — AMOXICILLIN 250 MG
1 CAPSULE ORAL 2 TIMES DAILY
Status: DISCONTINUED | OUTPATIENT
Start: 2024-03-27 | End: 2024-03-27 | Stop reason: HOSPADM

## 2024-03-27 RX ORDER — ACETAMINOPHEN 325 MG/1
650 TABLET ORAL EVERY 4 HOURS PRN
Status: DISCONTINUED | OUTPATIENT
Start: 2024-03-27 | End: 2024-03-27 | Stop reason: HOSPADM

## 2024-03-27 RX ORDER — HYDROMORPHONE HYDROCHLORIDE 1 MG/ML
0.5 INJECTION, SOLUTION INTRAMUSCULAR; INTRAVENOUS; SUBCUTANEOUS EVERY 30 MIN PRN
Status: DISCONTINUED | OUTPATIENT
Start: 2024-03-27 | End: 2024-03-27 | Stop reason: HOSPADM

## 2024-03-27 RX ORDER — ONDANSETRON 4 MG/1
4 TABLET, ORALLY DISINTEGRATING ORAL EVERY 6 HOURS PRN
Status: DISCONTINUED | OUTPATIENT
Start: 2024-03-27 | End: 2024-03-27 | Stop reason: HOSPADM

## 2024-03-27 RX ORDER — BISACODYL 10 MG
SUPPOSITORY, RECTAL RECTAL
COMMUNITY
Start: 2024-01-23

## 2024-03-27 RX ORDER — SODIUM CHLORIDE, SODIUM LACTATE, POTASSIUM CHLORIDE, CALCIUM CHLORIDE 600; 310; 30; 20 MG/100ML; MG/100ML; MG/100ML; MG/100ML
INJECTION, SOLUTION INTRAVENOUS CONTINUOUS
Status: DISCONTINUED | OUTPATIENT
Start: 2024-03-27 | End: 2024-03-27

## 2024-03-27 RX ORDER — IOPAMIDOL 755 MG/ML
135 INJECTION, SOLUTION INTRAVASCULAR ONCE
Status: COMPLETED | OUTPATIENT
Start: 2024-03-27 | End: 2024-03-27

## 2024-03-27 RX ORDER — HYDROMORPHONE HCL IN WATER/PF 6 MG/30 ML
0.2 PATIENT CONTROLLED ANALGESIA SYRINGE INTRAVENOUS
Status: DISCONTINUED | OUTPATIENT
Start: 2024-03-27 | End: 2024-03-27 | Stop reason: HOSPADM

## 2024-03-27 RX ADMIN — SODIUM CHLORIDE 1000 ML: 9 INJECTION, SOLUTION INTRAVENOUS at 05:24

## 2024-03-27 RX ADMIN — ONDANSETRON 4 MG: 2 INJECTION INTRAMUSCULAR; INTRAVENOUS at 09:00

## 2024-03-27 RX ADMIN — DOCUSATE SODIUM 50 MG AND SENNOSIDES 8.6 MG 1 TABLET: 8.6; 5 TABLET, FILM COATED ORAL at 09:08

## 2024-03-27 RX ADMIN — HYDROMORPHONE HYDROCHLORIDE 0.4 MG: 0.2 INJECTION, SOLUTION INTRAMUSCULAR; INTRAVENOUS; SUBCUTANEOUS at 08:09

## 2024-03-27 RX ADMIN — ONDANSETRON 4 MG: 2 INJECTION INTRAMUSCULAR; INTRAVENOUS at 03:47

## 2024-03-27 RX ADMIN — SODIUM CHLORIDE, POTASSIUM CHLORIDE, SODIUM LACTATE AND CALCIUM CHLORIDE: 600; 310; 30; 20 INJECTION, SOLUTION INTRAVENOUS at 06:25

## 2024-03-27 RX ADMIN — IOPAMIDOL 135 ML: 755 INJECTION, SOLUTION INTRAVENOUS at 05:07

## 2024-03-27 RX ADMIN — HYDROMORPHONE HYDROCHLORIDE 0.5 MG: 1 INJECTION, SOLUTION INTRAMUSCULAR; INTRAVENOUS; SUBCUTANEOUS at 09:00

## 2024-03-27 ASSESSMENT — COLUMBIA-SUICIDE SEVERITY RATING SCALE - C-SSRS
1. IN THE PAST MONTH, HAVE YOU WISHED YOU WERE DEAD OR WISHED YOU COULD GO TO SLEEP AND NOT WAKE UP?: NO
2. HAVE YOU ACTUALLY HAD ANY THOUGHTS OF KILLING YOURSELF IN THE PAST MONTH?: NO
6. HAVE YOU EVER DONE ANYTHING, STARTED TO DO ANYTHING, OR PREPARED TO DO ANYTHING TO END YOUR LIFE?: NO

## 2024-03-27 ASSESSMENT — ACTIVITIES OF DAILY LIVING (ADL)
ADLS_ACUITY_SCORE: 35

## 2024-03-27 NOTE — OP NOTE
"[facility]    Operative Note    Pre-operative diagnosis: Dysphagia, unspecified type [R13.10]  Post-operative diagnosis Same as pre-operative diagnosis    Procedure: ESOPHAGOGASTRODUODENOSCOPY, WITH WIRE-GUIDED GASTRIC STENT PLACEMENT; [id number] DISTAL; [id number] PROXIMAL, N/A - Esophagus    Surgeon: Surgeon(s) and Role:     * Jerome Seals MD - Primary  Anesthesia: General   Estimated Blood Loss: Minimal    Drains: None  Specimens: * No specimens in log *  Findings:   Sleeve anatomy with narrowings at upper an lower aspect of sleeve.  Complications: none  Implants:   Implant Name Type Inv. Item Serial No.  Lot No. LRB No. Used Action   STENT ESOPHAGEAL ENDOMAXX [id number] BHAVESH-2315 - [id number] Stent STENT ESOPHAGEAL ENDOMAXX [id number] BHAVESH-2315  [employer] [id number] N/A 1 Implanted   STENT ESOPHAGEAL ENDOMAXX [id number] BHAVESH-2310 - [id number] Stent STENT ESOPHAGEAL ENDOMAXX [id number] BHAVESH-2310  [employer] [id number] N/A 1 Implanted           INDICATIONS FOR PROCEDURE  Petty Hutchinson is a 23 year old female who underwent prior sleeve gastrectomy surgery in [facility]; has had severe challenges with gastric stricture and has required nasojejunal feeding tube AND prior dilatations twice.    I reviewed options, which include stent for stricture; goal is to maintain stent for up to 2-4 weeks in order to allow stretching of the narrowed aspects of her sleeve.    There is a diagnosis of gastrointestinal stricture.    Height: 177.8 cm (5' 10\"), Weight: 117.7 kg (259 lb 7.7 oz), and currently the Body mass index is 37.23 kg/m .    After understanding the risks and benefits of proceeding with an esophageal stent placement, she agreed to an operation as outlined by me.    I reviewed the risks of surgery with Petty Hutchinson and these include but are not limited to the following:    POTENTIAL COMPLICATIONS  Complications have been reported in the " literature for esophageal stent placement with both silicone stents and expandable metal stents.     PROCEDURAL COMPLICATIONS:   Bleeding   Esophageal perforation   Pain   Aspiration    POST-STENT PLACEMENT COMPLICATIONS:   Stent migration   Perforation   Bleeding   Pain/foreign body sensation   Occlusion due to lesion growth   Obstruction related to food volume   Infection   Reflux   Esophagitis   Esophageal ulceration   Edema   Fever   Fistula formation outside of normal disease progression   Death with cause outside of normal disease progression    DESCRIPTION OF PROCEDURE:   The patient was brought to the operating room, placed supine on the operating room table, and general anesthesia was induced.  A timeout was conducted with all members of the surgical team present to verify that the procedure and patient were correct.  The procedure was started by intubating the esophagus with a 10-mm adult endoscope.   The scope was advanced into the gastric sleeve, which was noted to be strictured and the strictures were traversed.    I went into the duodenum, which was normal and left an Amplatz stiff wire there and the gastroscope was removed with the wire left in place.  Next, the stent described above was then placed into the esophagus and advancement over the wire was confirmed optically then with gastroscope.  The proximal flare was positioned in the proximal sleeve with distal flare deployed in the distal stomach just proximal to pylorus. The stent was deployed under optical guidance.  The stent was somewhat tortuous at end of deployment of the 150mm stent; hence, a 2nd 100mm stent was deployed inside the primary stent using wire guidance and optically confirmed positioning inside the sleeve lumen; this stent was deployed with distal flare inside the proximal portion of the 150mm stent with about 40mm of overlap.  Hence, the 100mm stent proximal flare was deployed on distal esophagus and gastroscope confirmed good  positioning of the tandem stents.  The gastroscope was removed and the procedure was complete.  I was present for all portions of the operation.    Jerome Seals MD  Surgery  [phone] (hospital )  [phone] (clinic nurses

## 2024-03-27 NOTE — ED PROVIDER NOTES
ED Provider Note  [facility]      History     Chief Complaint   Patient presents with    Nausea, Vomiting, & Diarrhea     Pt reports having esophageal stent placed at this hospital yesterday and has been vomiting since the procedure was done.  Pt reports she is vomiting blood, does not see blood clots or tissue in emesis.  Pt unsure if febrile at home.  Pt reports midsternal CP.       HPI  ePtty Hutchinson is a 23 year old female who presents to us with a chief complaint of hematemesis.  [place]  This resulted in esophageal strictures.  She had esophageal stent here placed today.  She had some vomiting afterwards with some streaks of blood.  She went home and is having more brock hematemesis.  No dizziness.  She is having some chest pain.    Past Medical History  No past medical history on file.  No past surgical history on file.  hyoscyamine (LEVSIN/SL) 0.125 MG sublingual tablet      No Known Allergies  Family History  No family history on file.  Social History   Social History     Tobacco Use    Smoking status: Never   Substance Use Topics    Alcohol use: No    Drug use: No         A medically appropriate review of systems was performed with pertinent positives and negatives noted in the HPI, and all other systems negative.    Physical Exam   BP: 127/88  Pulse: 82  Temp: 98.3  F (36.8  C)  Resp: 16  SpO2: 99 %  Physical Exam  Constitutional:       General: She is not in acute distress.     Appearance: She is not diaphoretic.   HENT:      Head: Normocephalic and atraumatic.      Right Ear: External ear normal.      Left Ear: External ear normal.      Nose: Nose normal.   Eyes:      Extraocular Movements: Extraocular movements intact.      Conjunctiva/sclera: Conjunctivae normal.   Cardiovascular:      Rate and Rhythm: Normal rate and regular rhythm.      Heart sounds: Normal heart sounds.   Pulmonary:      Effort: Pulmonary effort is  normal. No respiratory distress.      Breath sounds: Normal breath sounds.   Abdominal:      General: There is no distension.      Palpations: Abdomen is soft.      Tenderness: There is no abdominal tenderness.   Musculoskeletal:         General: No swelling or deformity.      Cervical back: Normal range of motion and neck supple.   Skin:     General: Skin is warm and dry.   Neurological:      Mental Status: Mental status is at baseline.      Comments: Alert, oriented   Psychiatric:         Mood and Affect: Mood normal.         Behavior: Behavior normal.         ED Course, Procedures, & Data      Procedures            EKG Interpretation:      Interpreted by Alexsander Faith DO  Time reviewed: 337  Symptoms at time of EKG: Vomiting  Rhythm: normal sinus   Rate: normal  Axis: normal  Ectopy: none  Conduction: normal  ST Segments/ T Waves: No ST-T wave changes  Q Waves: none  Comparison to prior: No old EKG available    Clinical Impression: normal EKG          Results for orders placed or performed during the hospital encounter of 03/27/24   XR Chest 2 Views     Status: None    Narrative    EXAM: CHEST 2 VIEWS  LOCATION: [facility]  DATE: 03/27/2024    INDICATION: Esophageal stent placement. Hematemesis.  COMPARISON: None.    FINDINGS: Partial visualization of a stent with its proximal aspect in the distal esophagus and distal portion extending inferior to the field-of-view in the upper to mid abdomen. The lungs are clear. Normal-sized cardiac silhouette.      Impression    IMPRESSION:   1.  No evidence of active cardiopulmonary disease.  2.  Partial visualization of a stent with its most proximal aspect in the distal esophagus and its distal aspect extending inferior to the field-of-view in the upper to mid abdomen.      CT Chest/Abdomen/Pelvis w Contrast     Status: None (Preliminary result)    Narrative    EXAM: CT CHEST, ABDOMEN AND PELVIS WITH CONTRAST  LOCATION:   [facility]  DATE/TIME: 03/27/2024, 5:15 AM CDT    INDICATION: Hematemesis and pain after placement of esophageal stent.  COMPARISON: None.    TECHNIQUE: CT scan of the chest, abdomen, and pelvis was performed following injection of IV contrast. Multiplanar reformats were obtained. Dose reduction techniques were used.  CONTRAST: 135 mL Isovue 370.    FINDINGS:    LUNGS AND PLEURA: Unremarkable.    MEDIASTINUM/AXILLAE: Unremarkable.    CORONARY ARTERY CALCIFICATION: Absent.    HEPATOBILIARY: A focal wedge-shaped area of relative low-attenuation/hypoenhancement in the left lobe of the liver is nonspecific, but could represent focal fatty infiltration.    SPLEEN: Unremarkable.    PANCREAS: Unremarkable.    ADRENAL GLANDS: Unremarkable.    KIDNEYS/BLADDER: No suspicious renal lesions.    BOWEL: The esophagus, stomach, small and large bowel are normal in caliber. Prior gastric sleeve surgery. A gastroesophageal stent is present with its proximal aspect in the distal esophagus and its distal aspect in the gastric antrum. The gastric antrum   makes a 90-degree turn cranially at the most distal aspect of the stent (for example, series 6 image 40). A 2.3 cm long rodlike metallic structure is present within the stent within the stomach, extending from inner aspect of the stent (for example,   series 2 image 138 and series 6 image 50). A small amount of fluid is present within the lumen of the distal esophagus and stent, to the level of the mid gastric body. Normal appendix. No visualized bowel wall thickening, pneumatosis or free   intraperitoneal gas. No convincing CT evidence of active bleeding.    LYMPH NODES: Unremarkable.    PELVIC ORGANS: No acute findings.    MUSCULOSKELETAL: No acute findings.    OTHER: A trace amount of free fluid in the pelvis.      Impression    IMPRESSION:   1.  Prior gastric sleeve surgery. A gastroesophageal stent is present with the proximal portion of  the stent in the distal esophagus and the distal portion in the gastric antrum. The gastric antrum makes a 90-degree turn cranially at the most distal   aspect of the stent. This could possibly cause a partial occlusion or occlusion of the stent. If clinically relevant, an upper gastrointestinal study could be considered for further evaluation.  2.  A 2.3 cm rodlike metallic structure is present within the stent within the stomach, extending from its inner aspect. This is of uncertain etiology, but not an unexpected finding for the stent. Recommend clinical correlation.  3.  A trace amount of nonspecific free fluid in the pelvis.  4.  No other significant findings in the chest, abdomen or pelvis.       [place] Draw     Status: None (In process)    Narrative    The following orders were created for panel order [place] Draw.  Procedure                               Abnormality         Status                     ---------                               -----------         ------                     Extra Blue Top Tube[[id number]]                              Final result               Extra Red Top Tube[[id number]]                                                          Extra Green Top (Lithium...[[id number]]                                                 Extra Purple Top Tube[[id number]]                                                         Please view results for these tests on the individual orders.   Comprehensive metabolic panel     Status: Abnormal   Result Value Ref Range    Sodium 140 135 - 145 mmol/L    Potassium 3.4 3.4 - 5.3 mmol/L    Carbon Dioxide (CO2) 23 22 - 29 mmol/L    Anion Gap 14 7 - 15 mmol/L    Urea Nitrogen 4.3 (L) 6.0 - 20.0 mg/dL    Creatinine 0.75 0.51 - 0.95 mg/dL    GFR Estimate >90 >60 mL/min/1.73m2    Calcium 9.8 8.6 - 10.0 mg/dL    Chloride 103 98 - 107 mmol/L    Glucose 112 (H) 70 - 99 mg/dL    Alkaline Phosphatase 88 40 - 150 U/L    AST 21 0 - 45 U/L    ALT 22 0 - 50 U/L    Protein Total  8.0 6.4 - 8.3 g/dL    Albumin 4.5 3.5 - 5.2 g/dL    Bilirubin Total 0.4 <=1.2 mg/dL   HCG qualitative pregnancy (blood)     Status: Normal   Result Value Ref Range    hCG Serum Qualitative Negative Negative   Extra Blue Top Tube     Status: None   Result Value Ref Range    Hold Specimen [facility]    CBC with platelets and differential     Status: Abnormal   Result Value Ref Range    WBC Count 10.9 4.0 - 11.0 10e3/uL    RBC Count 4.95 3.80 - 5.20 10e6/uL    Hemoglobin 12.6 11.7 - 15.7 g/dL    Hematocrit 40.3 35.0 - 47.0 %    MCV 81 78 - 100 fL    MCH 25.5 (L) 26.5 - 33.0 pg    MCHC 31.3 (L) 31.5 - 36.5 g/dL    RDW 18.5 (H) 10.0 - 15.0 %    Platelet Count 308 150 - 450 10e3/uL    % Neutrophils 82 %    % Lymphocytes 12 %    % Monocytes 6 %    % Eosinophils 0 %    % Basophils 0 %    % Immature Granulocytes 0 %    NRBCs per 100 WBC 0 <1 /100    Absolute Neutrophils 8.9 (H) 1.6 - 8.3 10e3/uL    Absolute Lymphocytes 1.3 0.8 - 5.3 10e3/uL    Absolute Monocytes 0.6 0.0 - 1.3 10e3/uL    Absolute Eosinophils 0.0 0.0 - 0.7 10e3/uL    Absolute Basophils 0.0 0.0 - 0.2 10e3/uL    Absolute Immature Granulocytes 0.0 <=0.4 10e3/uL    Absolute NRBCs 0.0 10e3/uL   Glucose by meter     Status: Abnormal   Result Value Ref Range    GLUCOSE BY METER POCT 107 (H) 70 - 99 mg/dL   Adult Type and Screen     Status: None   Result Value Ref Range    ABO/RH(D) O POS     Antibody Screen Negative Negative    SPECIMEN EXPIRATION DATE [id number]    CBC with platelets differential     Status: Abnormal    Narrative    The following orders were created for panel order CBC with platelets differential.  Procedure                               Abnormality         Status                     ---------                               -----------         ------                     CBC with platelets and d...[[id number]]  Abnormal            Final result                 Please view results for these tests on the individual orders.   ABO/Rh type and screen      Status: None    Narrative    The following orders were created for panel order ABO/Rh type and screen.  Procedure                               Abnormality         Status                     ---------                               -----------         ------                     Adult Type and Screen[[id number]]                            Final result                 Please view results for these tests on the individual orders.     Medications   HYDROmorphone (PF) (DILAUDID) injection 0.5 mg (has no administration in time range)   sodium chloride 0.9% BOLUS 1,000 mL (1,000 mLs Intravenous $New Bag 3/27/24 3076)   senna-docusate (SENOKOT-S/PERICOLACE) 8.6-50 MG per tablet 1 tablet (has no administration in time range)     Or   senna-docusate (SENOKOT-S/PERICOLACE) 8.6-50 MG per tablet 2 tablet (has no administration in time range)   ondansetron (ZOFRAN ODT) ODT tab 4 mg (has no administration in time range)     Or   ondansetron (ZOFRAN) injection 4 mg (has no administration in time range)   acetaminophen (TYLENOL) tablet 650 mg (has no administration in time range)     Or   acetaminophen (TYLENOL) Suppository 650 mg (has no administration in time range)   HYDROmorphone (DILAUDID) injection 0.2 mg (has no administration in time range)   HYDROmorphone (DILAUDID) injection 0.4 mg (has no administration in time range)   senna-docusate (SENOKOT-S/PERICOLACE) 8.6-50 MG per tablet 1 tablet (has no administration in time range)     Or   senna-docusate (SENOKOT-S/PERICOLACE) 8.6-50 MG per tablet 2 tablet (has no administration in time range)   lactated ringers infusion (has no administration in time range)   ondansetron (ZOFRAN) injection 4 mg (4 mg Intravenous $Given 3/27/24 7407)   iopamidol (ISOVUE-370) solution 135 mL (135 mLs Intravenous $Given 3/27/24 0507)   sodium chloride (PF) 0.9% PF flush 84 mL (84 mLs Intravenous $Given 3/27/24 0506)     Labs Ordered and Resulted from Time of ED Arrival to Time of ED Departure    COMPREHENSIVE METABOLIC PANEL - Abnormal       Result Value    Sodium 140      Potassium 3.4      Carbon Dioxide (CO2) 23      Anion Gap 14      Urea Nitrogen 4.3 (*)     Creatinine 0.75      GFR Estimate >90      Calcium 9.8      Chloride 103      Glucose 112 (*)     Alkaline Phosphatase 88      AST 21      ALT 22      Protein Total 8.0      Albumin 4.5      Bilirubin Total 0.4     CBC WITH PLATELETS AND DIFFERENTIAL - Abnormal    WBC Count 10.9      RBC Count 4.95      Hemoglobin 12.6      Hematocrit 40.3      MCV 81      MCH 25.5 (*)     MCHC 31.3 (*)     RDW 18.5 (*)     Platelet Count 308      % Neutrophils 82      % Lymphocytes 12      % Monocytes 6      % Eosinophils 0      % Basophils 0      % Immature Granulocytes 0      NRBCs per 100 WBC 0      Absolute Neutrophils 8.9 (*)     Absolute Lymphocytes 1.3      Absolute Monocytes 0.6      Absolute Eosinophils 0.0      Absolute Basophils 0.0      Absolute Immature Granulocytes 0.0      Absolute NRBCs 0.0     GLUCOSE BY METER - Abnormal    GLUCOSE BY METER POCT 107 (*)    HCG QUALITATIVE PREGNANCY - Normal    hCG Serum Qualitative Negative     GLUCOSE MONITOR NURSING POCT   TYPE AND SCREEN, ADULT    ABO/RH(D) O POS      Antibody Screen Negative      SPECIMEN EXPIRATION DATE [id number]     ABO/RH TYPE AND SCREEN     CT Chest/Abdomen/Pelvis w Contrast   Preliminary Result   IMPRESSION:    1.  Prior gastric sleeve surgery. A gastroesophageal stent is present with the proximal portion of the stent in the distal esophagus and the distal portion in the gastric antrum. The gastric antrum makes a 90-degree turn cranially at the most distal    aspect of the stent. This could possibly cause a partial occlusion or occlusion of the stent. If clinically relevant, an upper gastrointestinal study could be considered for further evaluation.   2.  A 2.3 cm rodlike metallic structure is present within the stent within the stomach, extending from its inner aspect. This is of  uncertain etiology, but not an unexpected finding for the stent. Recommend clinical correlation.   3.  A trace amount of nonspecific free fluid in the pelvis.   4.  No other significant findings in the chest, abdomen or pelvis.            XR Chest 2 Views   Final Result   IMPRESSION:    1.  No evidence of active cardiopulmonary disease.   2.  Partial visualization of a stent with its most proximal aspect in the distal esophagus and its distal aspect extending inferior to the field-of-view in the upper to mid abdomen.               Medical Decision Making  The patient's presentation is strongly suggestive of high complexity (an acute health issue posing potential threat to life or bodily function).    The patient's evaluation involved:  review of external note(s) from 3+ sources (Most recent H&P in addition to clinic and ED note)  review of 2 test result(s) ordered prior to this encounter (Most recent BMP and CBC)  2+ tests ordered today BMP CBC  Management discussed with other healthcare provider General surgery  The patient's management involved high risk (a decision regarding hospitalization).    Assessment & Plan    23-year-old female presents to us with chief complaint hematemesis.  Differential includes but not limited to esophageal abrasion, hematemesis, esophageal rupture, anemia.  Vital signs initially were unremarkable.  Initial hemoglobin was normal.  Patient was given and IV Zofran fluids and Dilaudid for her symptoms.  Discussed with surgery resident evaluate the patient.  They recommended CT scan that showed no perforations.  Patient will be placed under observation under bariatric surgery Dr. Seals for pain control    I have reviewed the nursing notes. I have reviewed the findings, diagnosis, plan and need for follow up with the patient.    New Prescriptions    No medications on file       Final diagnoses:   Post-op pain       Alexsander Faith  [facility] EMERGENCY  DEPARTMENT  3/27/2024     Alexsander Faith,   03/27/24 0610

## 2024-03-27 NOTE — TELEPHONE ENCOUNTER
Notified patient that Dr. Seals would like to remove the stent on Friday, March 29 at 1:20 p.m. at the [facility], to arrive at 11:20 a.m. Patient understands.

## 2024-03-27 NOTE — ED TRIAGE NOTES
Pt reports having esophageal stent placed at this hospital yesterday and has been vomiting since the procedure was done.  Pt reports she is vomiting blood, does not see blood clots or tissue in emesis.  Pt unsure if febrile at home.  Pt reports midsternal CP.       Triage Assessment (Adult)       Row Name 03/27/24 0330          Triage Assessment    Airway WDL WDL        Respiratory WDL    Respiratory WDL WDL        Cardiac WDL    Cardiac WDL WDL        Cognitive/Neuro/Behavioral WDL    Cognitive/Neuro/Behavioral WDL WDL

## 2024-03-27 NOTE — CONSULTS
[facility] Surgery Consultation    Petty Hutchinson MRN# [id number]   Age: 23 year old YOB: 2000     Date of Admission:  3/27/2024    Date of Consult:   3/27/24    Reason for consult: Nausea and vomiting s/p gastric stent placement       Requesting service: ED; requesting provider: Dr. Faith                   Assessment and Plan:   Assessment:    Mr. Petty Hutchinson is a 24 yo male patient with PMHx relevant for morbid obesity and a known sleeve stricture done s/p dilation x2 and s/p EGD with wire-guided gastric stent placement by Dr. Seals on 3/26. Patient now complaining of severe epigastric pain and hematemesis at home. Labs unremarkable and patient hemodynamically stable.     Plan:    - No acute surgical intervention indicated at this moment  - Admit to Obs under EGS for pain management  - NPO  - mIVF  - Please call with further questions and concerns      Patient discussed with chief resident, Dr. White, and staff, Dr. Khan.    Jd Woodson MD  PGY2  Dept. of Surgery          Chief Complaint:     Epigastric pain and vomiting         History of Present Illness:     Mr. Petty Hutchinson is a 24 yo male patient with PMHx relevant for morbid obesity and a known sleeve stricture done in [place] on 12/2023 and s/p dilation x2 with with Dr. Seals, now s/p EGD with wire-guided gastric stent placement by Dr. Seals on 3/26 as well.    Patient came this early morning to our ED for assessment of nausea, vomiting and severe epigastric pain, with radiation into a band pattern in the upper abdomen that goes through her back. She describes this pain as sharp, 9.5/10. In addition, after intervention, patient was spitting streaks of blood, which now she mentioned she has spit red blood, 1-2 teaspoons on 5 occasions.     On admission to the ED, labs are unremarkable except for hyperglycemia of 112.             Past Medical History:   No past medical history on file.            Past Surgical History:    No past surgical history on file.            Social History:     Social History     Tobacco Use    Smoking status: Never    Smokeless tobacco: Not on file   Substance Use Topics    Alcohol use: No             Family History:   No family history on file.             Allergies:   No Known Allergies          Medications:     Current Facility-Administered Medications   Medication    HYDROmorphone (PF) (DILAUDID) injection 0.5 mg    iopamidol (ISOVUE-370) solution 135 mL    sodium chloride (PF) 0.9% PF flush 84 mL    sodium chloride 0.9% BOLUS 1,000 mL     Current Outpatient Medications   Medication Sig    hyoscyamine (LEVSIN/SL) 0.125 MG sublingual tablet Place 1 tablet (0.125 mg) under the tongue every 4 hours as needed for cramping               Review of Systems:     Complete review of systems negative except as documented in HPI          Physical Exam:   All vitals have been reviewed  Temp:  [97.9  F (36.6  C)-98.3  F (36.8  C)] 98.3  F (36.8  C)  Pulse:  [] 82  Resp:  [16-22] 16  BP: (119-143)/(64-88) 127/88  SpO2:  [99 %-100 %] 99 %  No intake or output data in the 24 hours ending 03/27/24 6455    Physical Exam:   General: Alert, well-appearing in mild distress due to epigastric pain.  Neuro: A&Ox3  HEENT: Normocephalic, atraumatic.   Neck: No cervical lymphadenopathy. No thyromegaly.   Respiratory: Non-labored breathing. Lung sounds clear to auscultation bilaterally without rhonchi, crackles or wheeze  Cardiovascular: Regular rate and rhythm. No tachycardia. Strong radial pulse  Gastrointestinal: Abdomen soft, distended due to adipose tissue, tender to palpation in the upper abdomen more severe in the epigastrium. Non peritonitic. No organomegaly or masses appreciated. No inguinal hernias.   MSK/Extremities: Moving all four extremities spontaneously. Warm, well perfused. No peripheral edema.  peripheral pulses intact.              Data:   All laboratory data reviewed    Results:  BMP  Recent Labs   Lab  03/27/24  0356 03/27/24  0342   NA  --  140   POTASSIUM  --  3.4   CHLORIDE  --  103   CO2  --  23   BUN  --  4.3*   CR  --  0.75   * 112*     CBC  Recent Labs   Lab 03/27/24 0342   WBC 10.9   HGB 12.6        LFT  Recent Labs   Lab 03/27/24 0342   AST 21   ALT 22   ALKPHOS 88   BILITOTAL 0.4   ALBUMIN 4.5     Recent Labs   Lab 03/27/24  0356 03/27/24 0342   * 112*           Imaging:  CT CAP 3/27/2024 pending reading.

## 2024-03-27 NOTE — MEDICATION SCRIBE - ADMISSION MEDICATION HISTORY
Medication Scribe Admission Medication History    Admission medication history is complete. The information provided in this note is only as accurate as the sources available at the time of the update.    Information Source(s): Patient and CareEverywhere/SureScripts via in-person    Pertinent Information: Patient reports not taking any other OTC or prescription medication apart from the 2 listed below. I asked about Tivicay and Emtricitabine-Tenofovir and she confirmed not taking those.    Changes made to PTA medication list:  Added: bisacodyl 10 mg  Deleted: None  Changed: None    Allergies reviewed with patient and updates made in EHR: yes    Medication History Completed By: Kalani Coburn 3/27/2024 7:51 AM    PTA Med List   Medication Sig Last Dose    bisacodyl (DULCOLAX) 10 MG suppository Insert 1 Suppository (10 mg) rectally daily as needed for Constipation. 3/23/2024 at PM    hyoscyamine (LEVSIN/SL) 0.125 MG sublingual tablet Place 1 tablet (0.125 mg) under the tongue every 4 hours as needed for cramping 3/26/2024 at 1800

## 2024-03-28 ENCOUNTER — ANESTHESIA EVENT (OUTPATIENT)
Dept: SURGERY | Facility: CLINIC | Age: 24
End: 2024-03-28
Payer: COMMERCIAL

## 2024-03-28 ENCOUNTER — CARE COORDINATION (OUTPATIENT)
Dept: ENDOCRINOLOGY | Facility: CLINIC | Age: 24
End: 2024-03-28
Payer: COMMERCIAL

## 2024-03-28 ENCOUNTER — PATIENT OUTREACH (OUTPATIENT)
Dept: CARE COORDINATION | Facility: CLINIC | Age: 24
End: 2024-03-28
Payer: COMMERCIAL

## 2024-03-28 DIAGNOSIS — R10.13 EPIGASTRIC PAIN: Primary | ICD-10-CM

## 2024-03-28 RX ORDER — OXYCODONE HYDROCHLORIDE 5 MG/1
5 TABLET ORAL EVERY 12 HOURS PRN
Qty: 20 TABLET | Refills: 0 | Status: SHIPPED | OUTPATIENT
Start: 2024-03-28 | End: 2024-04-07

## 2024-03-28 NOTE — PROGRESS NOTES
[facility]    Background: Transitional Care Management program identified per system criteria and reviewed by [facility] team for possible outreach.    Assessment: Upon chart review, CCR Team member will not proceed with patient outreach related to this episode of Transitional Care Management program due to reason below:    Patient has active communication with a nurse, provider or care team for reason of post-hospital follow up plan.  Outreach call by CCR team not indicated to minimize duplicative efforts.     Plan: Transitional Care Management episode addressed appropriately per reason noted above.        RYANNE Gaming  [phone]  [facility]

## 2024-03-28 NOTE — PROGRESS NOTES
Patient notified Dr Seals sent oxycodone to [facility]. Informed patient Dr Seals would like her to try to keep the stent in at least 2 weeks. Patient will let us know if she is feeling better tomorrow and wants to leave stent in.

## 2024-03-28 NOTE — PROGRESS NOTES
Patient states she is having pain, zofran is helping with nausea.   Patient encouraged to take 2 tylenol every 6 hours for pain and to ambulate as much as possible. To also push fluids as tolerated.     Patient scheduled to have stent removal tomorrow.    Patient verbalized understanding.

## 2024-03-28 NOTE — ANESTHESIA PREPROCEDURE EVALUATION
Anesthesia Pre-Procedure Evaluation    Patient: Petty Hutchinson   MRN: [id number] : 2000        Procedure : Procedure(s):  ESOPHAGOGASTRODUODENOSCOPY, WITH ESOPHAGEAL STENT REMOVAL          History reviewed. No pertinent past medical history.   Past Surgical History:   Procedure Laterality Date    ABDOMEN SURGERY      gastric sleeve    COMBINED ESOPHAGOSCOPY, GASTROSCOPY, DUODENOSCOPY (EGD), REPLACE ESOPHAGEAL STENT N/A 2024    Procedure: ESOPHAGOGASTRODUODENOSCOPY, WITH WIRE-GUIDED GASTRIC STENT PLACEMENT; 830LMQ14DH DISTAL; [id number] PROXIMAL;  Surgeon: Jerome Seals MD;  Location: [facility] OR      No Known Allergies   Social History     Tobacco Use    Smoking status: Never    Smokeless tobacco: Not on file   Substance Use Topics    Alcohol use: No      Wt Readings from Last 1 Encounters:   24 117.7 kg (259 lb 7.7 oz)        Anesthesia Evaluation   Pt has had prior anesthetic. Type: General (3/26/24: Direct with elizalde 2 blade. Grade 1 view.).        ROS/MED HX  ENT/Pulmonary:  - neg pulmonary ROS     Neurologic:  - neg neurologic ROS     Cardiovascular:  - neg cardiovascular ROS     METS/Exercise Tolerance:     Hematologic:  - neg hematologic  ROS     Musculoskeletal:  - neg musculoskeletal ROS     GI/Hepatic: Comment: Dysphagia  S/p gastric sleeve c/b stricture s/p stent, continued nausea, inability to tolerate PO intake  Hematemesis      Renal/Genitourinary:  - neg Renal ROS     Endo:     (+)               Obesity,       Psychiatric/Substance Use:  - neg psychiatric ROS     Infectious Disease:  - neg infectious disease ROS     Malignancy:  - neg malignancy ROS     Other:            Physical Exam    Airway        Mallampati: III   TM distance: > 3 FB   Neck ROM: full   Mouth opening: > 3 cm    Respiratory Devices and Support         Dental     Comment: Patient reports no loose or chipped teeth        Cardiovascular          Rhythm and rate: regular and normal     Pulmonary           breath  "sounds clear to auscultation           OUTSIDE LABS:  CBC:   Lab Results   Component Value Date    WBC 10.9 03/27/2024    HGB 12.6 03/27/2024    HCT 40.3 03/27/2024     03/27/2024     BMP:   Lab Results   Component Value Date     03/27/2024    POTASSIUM 3.4 03/27/2024    CHLORIDE 103 03/27/2024    CO2 23 03/27/2024    BUN 4.3 (L) 03/27/2024    CR 0.75 03/27/2024     (H) 03/27/2024     (H) 03/27/2024     COAGS: No results found for: \"PTT\", \"INR\", \"FIBR\"  POC:   Lab Results   Component Value Date    HCGS Negative 03/27/2024     HEPATIC:   Lab Results   Component Value Date    ALBUMIN 4.5 03/27/2024    PROTTOTAL 8.0 03/27/2024    ALT 22 03/27/2024    AST 21 03/27/2024    ALKPHOS 88 03/27/2024    BILITOTAL 0.4 03/27/2024     OTHER:   Lab Results   Component Value Date    JESE 9.8 03/27/2024       Anesthesia Plan    ASA Status:  2    NPO Status:  NPO Appropriate    Anesthesia Type: General.     - Airway: ETT   Induction: Intravenous.   Maintenance: Balanced.   Techniques and Equipment:     - Lines/Monitors: BIS     Consents    Anesthesia Plan(s) and associated risks, benefits, and realistic alternatives discussed. Questions answered and patient/representative(s) expressed understanding.     - Discussed:     - Discussed with:  Patient      - Extended Intubation/Ventilatory Support Discussed: No.      - Patient is DNR/DNI Status: No     Use of blood products discussed: No .     Postoperative Care    Pain management: IV analgesics, Multi-modal analgesia, Oral pain medications.   PONV prophylaxis: Ondansetron (or other 5HT-3), Dexamethasone or Solumedrol     Comments:               Christiano Echeverria MD    I have reviewed the pertinent notes and labs in the chart from the past 30 days and (re)examined the patient.  Any updates or changes from those notes are reflected in this note.              # Obesity: Estimated body mass index is 37.23 kg/m  as calculated from the following:    Height as of " "3/26/24: 1.778 m (5' 10\").    Weight as of 3/26/24: 117.7 kg (259 lb 7.7 oz).      "

## 2024-03-29 ENCOUNTER — HOSPITAL ENCOUNTER (OUTPATIENT)
Facility: CLINIC | Age: 24
Discharge: HOME | End: 2024-03-29
Attending: SURGERY | Admitting: SURGERY
Payer: COMMERCIAL

## 2024-03-29 ENCOUNTER — ANESTHESIA (OUTPATIENT)
Dept: SURGERY | Facility: CLINIC | Age: 24
End: 2024-03-29
Payer: COMMERCIAL

## 2024-03-29 ENCOUNTER — CARE COORDINATION (OUTPATIENT)
Dept: ENDOCRINOLOGY | Facility: CLINIC | Age: 24
End: 2024-03-29
Payer: COMMERCIAL

## 2024-03-29 VITALS
HEART RATE: 49 BPM | WEIGHT: 259.48 LBS | TEMPERATURE: 97.4 F | RESPIRATION RATE: 16 BRPM | DIASTOLIC BLOOD PRESSURE: 78 MMHG | OXYGEN SATURATION: 96 % | SYSTOLIC BLOOD PRESSURE: 104 MMHG | BODY MASS INDEX: 37.15 KG/M2 | HEIGHT: 70 IN

## 2024-03-29 DIAGNOSIS — R13.10 DYSPHAGIA, UNSPECIFIED TYPE: ICD-10-CM

## 2024-03-29 PROCEDURE — 250N000011 HC RX IP 250 OP 636

## 2024-03-29 PROCEDURE — 710N000010 HC RECOVERY PHASE 1, LEVEL 2, PER MIN: Performed by: SURGERY

## 2024-03-29 PROCEDURE — 43247 EGD REMOVE FOREIGN BODY: CPT | Performed by: ANESTHESIOLOGY

## 2024-03-29 PROCEDURE — 250N000009 HC RX 250: Performed by: ANESTHESIOLOGY

## 2024-03-29 PROCEDURE — 250N000025 HC SEVOFLURANE, PER MIN: Performed by: SURGERY

## 2024-03-29 PROCEDURE — 710N000012 HC RECOVERY PHASE 2, PER MINUTE: Performed by: SURGERY

## 2024-03-29 PROCEDURE — 43247 EGD REMOVE FOREIGN BODY: CPT | Mod: GC | Performed by: SURGERY

## 2024-03-29 PROCEDURE — 999N000141 HC STATISTIC PRE-PROCEDURE NURSING ASSESSMENT: Performed by: SURGERY

## 2024-03-29 PROCEDURE — 370N000017 HC ANESTHESIA TECHNICAL FEE, PER MIN: Performed by: SURGERY

## 2024-03-29 PROCEDURE — 250N000013 HC RX MED GY IP 250 OP 250 PS 637: Performed by: ANESTHESIOLOGY

## 2024-03-29 PROCEDURE — 258N000003 HC RX IP 258 OP 636

## 2024-03-29 PROCEDURE — 272N000001 HC OR GENERAL SUPPLY STERILE: Performed by: SURGERY

## 2024-03-29 PROCEDURE — 250N000011 HC RX IP 250 OP 636: Performed by: ANESTHESIOLOGY

## 2024-03-29 PROCEDURE — 360N000082 HC SURGERY LEVEL 2 W/ FLUORO, PER MIN: Performed by: SURGERY

## 2024-03-29 PROCEDURE — 250N000013 HC RX MED GY IP 250 OP 250 PS 637

## 2024-03-29 RX ORDER — LIDOCAINE 40 MG/G
CREAM TOPICAL
Status: DISCONTINUED | OUTPATIENT
Start: 2024-03-29 | End: 2024-03-29 | Stop reason: HOSPADM

## 2024-03-29 RX ORDER — FENTANYL CITRATE 50 UG/ML
INJECTION, SOLUTION INTRAMUSCULAR; INTRAVENOUS PRN
Status: DISCONTINUED | OUTPATIENT
Start: 2024-03-29 | End: 2024-03-29

## 2024-03-29 RX ORDER — SODIUM CHLORIDE, SODIUM LACTATE, POTASSIUM CHLORIDE, CALCIUM CHLORIDE 600; 310; 30; 20 MG/100ML; MG/100ML; MG/100ML; MG/100ML
INJECTION, SOLUTION INTRAVENOUS CONTINUOUS
Status: DISCONTINUED | OUTPATIENT
Start: 2024-03-29 | End: 2024-03-29 | Stop reason: HOSPADM

## 2024-03-29 RX ORDER — HYDROMORPHONE HCL IN WATER/PF 6 MG/30 ML
0.2 PATIENT CONTROLLED ANALGESIA SYRINGE INTRAVENOUS EVERY 5 MIN PRN
Status: DISCONTINUED | OUTPATIENT
Start: 2024-03-29 | End: 2024-03-29 | Stop reason: HOSPADM

## 2024-03-29 RX ORDER — FENTANYL CITRATE 50 UG/ML
50 INJECTION, SOLUTION INTRAMUSCULAR; INTRAVENOUS EVERY 5 MIN PRN
Status: DISCONTINUED | OUTPATIENT
Start: 2024-03-29 | End: 2024-03-29 | Stop reason: HOSPADM

## 2024-03-29 RX ORDER — DEXAMETHASONE SODIUM PHOSPHATE 4 MG/ML
INJECTION, SOLUTION INTRA-ARTICULAR; INTRALESIONAL; INTRAMUSCULAR; INTRAVENOUS; SOFT TISSUE PRN
Status: DISCONTINUED | OUTPATIENT
Start: 2024-03-29 | End: 2024-03-29

## 2024-03-29 RX ORDER — CEFAZOLIN SODIUM/WATER 2 G/20 ML
2 SYRINGE (ML) INTRAVENOUS SEE ADMIN INSTRUCTIONS
Status: DISCONTINUED | OUTPATIENT
Start: 2024-03-29 | End: 2024-03-29 | Stop reason: HOSPADM

## 2024-03-29 RX ORDER — HYDROMORPHONE HCL IN WATER/PF 6 MG/30 ML
0.4 PATIENT CONTROLLED ANALGESIA SYRINGE INTRAVENOUS EVERY 5 MIN PRN
Status: DISCONTINUED | OUTPATIENT
Start: 2024-03-29 | End: 2024-03-29 | Stop reason: HOSPADM

## 2024-03-29 RX ORDER — CEFAZOLIN SODIUM/WATER 2 G/20 ML
2 SYRINGE (ML) INTRAVENOUS
Status: DISCONTINUED | OUTPATIENT
Start: 2024-03-29 | End: 2024-03-29 | Stop reason: HOSPADM

## 2024-03-29 RX ORDER — FENTANYL CITRATE 50 UG/ML
25 INJECTION, SOLUTION INTRAMUSCULAR; INTRAVENOUS EVERY 5 MIN PRN
Status: DISCONTINUED | OUTPATIENT
Start: 2024-03-29 | End: 2024-03-29 | Stop reason: HOSPADM

## 2024-03-29 RX ORDER — ONDANSETRON 2 MG/ML
4 INJECTION INTRAMUSCULAR; INTRAVENOUS ONCE
Status: COMPLETED | OUTPATIENT
Start: 2024-03-29 | End: 2024-03-29

## 2024-03-29 RX ORDER — ONDANSETRON 2 MG/ML
4 INJECTION INTRAMUSCULAR; INTRAVENOUS EVERY 30 MIN PRN
Status: DISCONTINUED | OUTPATIENT
Start: 2024-03-29 | End: 2024-03-29 | Stop reason: HOSPADM

## 2024-03-29 RX ORDER — ONDANSETRON 4 MG/1
4 TABLET, ORALLY DISINTEGRATING ORAL EVERY 30 MIN PRN
Status: DISCONTINUED | OUTPATIENT
Start: 2024-03-29 | End: 2024-03-29 | Stop reason: HOSPADM

## 2024-03-29 RX ORDER — NALOXONE HYDROCHLORIDE 0.4 MG/ML
0.1 INJECTION, SOLUTION INTRAMUSCULAR; INTRAVENOUS; SUBCUTANEOUS
Status: DISCONTINUED | OUTPATIENT
Start: 2024-03-29 | End: 2024-03-29 | Stop reason: HOSPADM

## 2024-03-29 RX ORDER — SCOLOPAMINE TRANSDERMAL SYSTEM 1 MG/1
1 PATCH, EXTENDED RELEASE TRANSDERMAL
Status: DISCONTINUED | OUTPATIENT
Start: 2024-03-29 | End: 2024-03-29 | Stop reason: HOSPADM

## 2024-03-29 RX ORDER — ACETAMINOPHEN 325 MG/1
975 TABLET ORAL ONCE
Status: COMPLETED | OUTPATIENT
Start: 2024-03-29 | End: 2024-03-29

## 2024-03-29 RX ORDER — ONDANSETRON 2 MG/ML
INJECTION INTRAMUSCULAR; INTRAVENOUS PRN
Status: DISCONTINUED | OUTPATIENT
Start: 2024-03-29 | End: 2024-03-29

## 2024-03-29 RX ORDER — PROPOFOL 10 MG/ML
INJECTION, EMULSION INTRAVENOUS PRN
Status: DISCONTINUED | OUTPATIENT
Start: 2024-03-29 | End: 2024-03-29

## 2024-03-29 RX ADMIN — ONDANSETRON 4 MG: 2 INJECTION INTRAMUSCULAR; INTRAVENOUS at 14:05

## 2024-03-29 RX ADMIN — ACETAMINOPHEN 650 MG: 325 TABLET, FILM COATED ORAL at 11:49

## 2024-03-29 RX ADMIN — Medication 1 LOZENGE: at 15:19

## 2024-03-29 RX ADMIN — DEXAMETHASONE SODIUM PHOSPHATE 4 MG: 4 INJECTION, SOLUTION INTRA-ARTICULAR; INTRALESIONAL; INTRAMUSCULAR; INTRAVENOUS; SOFT TISSUE at 14:05

## 2024-03-29 RX ADMIN — SODIUM CHLORIDE, POTASSIUM CHLORIDE, SODIUM LACTATE AND CALCIUM CHLORIDE: 600; 310; 30; 20 INJECTION, SOLUTION INTRAVENOUS at 13:44

## 2024-03-29 RX ADMIN — Medication 1 LOZENGE: at 16:09

## 2024-03-29 RX ADMIN — SUCCINYLCHOLINE CHLORIDE 140 MG: 20 INJECTION, SOLUTION INTRAMUSCULAR; INTRAVENOUS; PARENTERAL at 13:53

## 2024-03-29 RX ADMIN — PROPOFOL 200 MG: 10 INJECTION, EMULSION INTRAVENOUS at 13:53

## 2024-03-29 RX ADMIN — ONDANSETRON 4 MG: 2 INJECTION INTRAMUSCULAR; INTRAVENOUS at 12:08

## 2024-03-29 RX ADMIN — FENTANYL CITRATE 50 MCG: 50 INJECTION INTRAMUSCULAR; INTRAVENOUS at 13:53

## 2024-03-29 RX ADMIN — SCOPALAMINE 1 PATCH: 1 PATCH, EXTENDED RELEASE TRANSDERMAL at 12:07

## 2024-03-29 ASSESSMENT — ACTIVITIES OF DAILY LIVING (ADL)
ADLS_ACUITY_SCORE: 29
ADLS_ACUITY_SCORE: 32
ADLS_ACUITY_SCORE: 32
ADLS_ACUITY_SCORE: 31
ADLS_ACUITY_SCORE: 32
ADLS_ACUITY_SCORE: 31
ADLS_ACUITY_SCORE: 31

## 2024-03-29 NOTE — DISCHARGE INSTRUCTIONS
After Bariatric Surgery Discharge Instructions  [facility]     Note: Ask your nurse to order your medications from the pharmacy. Be sure you have your medications with you when you leave.  Diet on discharge, return to your previous diet.    How much fluid should I drink?  Strive for 48-64 ounces daily.  Carry a water bottle with you without a straw or sports top. Drink from it often.  Keep track of how much fluid you drink in a day.  Remember:  -Do not use straws, chew gum or suck on hard candies. They may cause painful gas.    -Sip, don't slurp when you drink.    -Practice small sips using a medicine cup for the first week postop.   -No ice or cold drinks. This could cause gas or spasms.   -No coffee, soda pop or drinks with caffeine. These may cause stomach pain.   -No alcohol. It is bad for your liver and will cause stomach pain.    How often should I do my deep breathing and coughing?  Use your incentive spirometer (small plastic breathing device) every hour while awake after you get home. Using the incentive spirometer helps you deep breath. Continuing to cough and deep breath will help prevent fevers and pneumonia.   If you do not have a fever after one week, you can stop using the incentive spirometer and discard it.     You can continue to take deep breaths without the incentive spirometer every one to two hours while awake for the month after surgery.    What kinds of activity can I do?  Get plenty of rest the first few days after surgery and try to balance rest and activity. You will need some time to recover - you may be more tired than you realize at first. You'll feel better and heal faster if you take good care of yourself.  For 4 weeks after surgery (Please review restrictions at your one week visit, they could change based on how well you are doing):  -Take 4-5 short walks every day.  -Don't jog, run, or do belly exercises.  Don't plan to fly or take a road trip  within the first 1 to 3 months after surgery.  You could get a blood clot in your legs. If you must travel, get up and move around every hour for at least 5 minutes before continuing on your journey.  Your care team can help you decide when it's safe for you to travel.     What can I do for pain control?  Please see your after visit summary medication review for what pain medication will be continued, discontinued and newly started for you.    You can take opioid pain medicine if prescribed and if needed. Try to wean off from it as soon as you feel comfortable.   Do not drive while you are taking opioid pain medicine. This is dangerous.  You can take acetaminophen (Tylenol) between your prescribed pain medicine on a scheduled basis OR take it scheduled every 6 hours (check with your care team for specifics).  -Acetaminophen formulation options:    -Liquid   -Caplet (Cut caplet in half before taking)   -Do not take more than 3000 mg Tylenol in a 24 hour period.  -You may also take Tylenol for pain in place of the opioid pain medicine (check with your care team for specifics).   You can apply ice or heat to the affected area(s). Just remember to wrap the ice in something and limit icing sessions to 20 minutes. Excessive icing can irritate the skin or cause skin damage.  You can apply heat with a hot, wet towel or heating pad. Just like cold therapy, limit heat application to 20 minutes. Never sleep with a heating pad on. It could cause severe burns to your skin.  Wear your binder to support your belly muscles if you have one.  Take this off a little more each day and try to be off completely by 2 weeks after surgery. If you don't need your binder for comfort or support, you don't need to wear it.   You may be more comfortable sleeping in a recliner or propped up with 3 pillows for the first couple of weeks after surgery.  Do not take NSAID's (Non-Steroidal Anti-Inflammatory Drugs) (examples: ibuprofen, Motrin, Advil,  Aleve, and Naproxen), aspirin, or use pain patches with NSAID's. They will increase your risk of bleeding or getting an ulcer.    Please call the clinic for any of the following pain concerns, we would like to talk to you:  -pain that does not improve with rest  -pain that gets worse and worse  -pain that is not controlled by your pain medicine  -a sudden severe increase in pain    What medications will I need to take after surgery?  You may be discharged with the following types of medications: omeprazole 20 mg once daily for heartburn symptom prevention, zofran as needed for nausea and a medication called hyoscyamine (levsin) as needed for cramping.Please see your after visit summary medication review for what will be continued, discontinued and newly started.  It is important to reduce the amount of acid in your new stomach for a couple of months after surgery while it is still healing. We will prescribe an acid reducer or antacid. Take it as directed. This will help prevent ulcers, heartburn and acid reflux.  If you took an acid reducer before you had surgery, your care team will let you know what acid reducer you will take after surgery.   It is okay to swallow any medications smaller than   inch in size.   -If it is larger than   inch, it may need to be cut, crushed, or in a liquid form. Check with your care team about which way is most appropriate for you and your medications.    Call the clinic if you have any of these signs or symptoms of infection:  -Redness around the site.  -Drainage that smells bad.  -Drainage that is thick yellow or green.  -An increase in pain around the incision site  -An increase in swelling around the incision site  -Heat or warmth around incision site   -Fever of 101.5  F (38.3  C) or higher when taken under the tongue.    -Chills    Will my urine or bowel movements change?   Your first bowel movements (stools) will likely be liquid. You may also notice old blood or a darker color  (black or maroon color) in your bowel movements.  This is not unusual and usually goes away after the first week, if not sooner. You may not have a bowel movement for a week.   If you have not had a bowel movement for at least three days after your surgery date and are passing gas, you can use over the counter stool softeners.  Please stop taking the stool softeners and laxatives if your stools are loose.  Increasing fluids and activity as well as getting off narcotics will help prevent constipation.    Call the clinic if:   -You have stomach pain.   -You have excessive bloating after walking and passing gas.    How can I prevent dehydration if I feel nauseated (sick to my stomach) and vomit (throw up)?   Vomiting is not normal after surgery. If you continue to have nausea and vomiting, call the clinic.   Nausea can be a sign of dehydration. That is why it is very important to track your fluids.  Do not nap more than one hour during the day. Set a timer to wake yourself up, if needed. Too much sleep will keep you from drinking enough fluid during the day and lead to dehydration.  No outside activity in hot, humid weather until you can drink 48 to 64 ounces of fluid in 24 hours. If you sweat a lot, your body may lose too much water.  Try to take a 1 ounce sip of water (one medicine cup) every 15 minutes.  Set a timer to remind yourself.    Call the clinic if you have any of these signs or symptoms of dehydration:  -Dark colored urine  -Urinating (pass water) less than 2-3 times per day  -Lack of energy  -Nausea  -Dizziness  -Headache    Call the clinic ANY TIME at [phone] if:  -Your pain medicine is not working.  -You have a fever ? 101.5 F.  -You have belly or left shoulder pain that gets worse and worse.  -You have a swollen leg with redness, warmth, or pain behind the knee or calf.  -You have chest pain   -You feel very short of breath.  -You have a sudden severe increase in heart rate.  -You have vomiting  "that gets worse and worse.  -You have constant nausea (feeling sick to your stomach) that does not go away with medication.  -You have trouble swallowing.  -You have an increasing feeling that \"something is not right\".  -You have hiccups that do not stop.  -You have any questions or concerns.    AFTER HOURS QUESTIONS OR CONCERNS: Call [phone] and ask to speak with surgery resident if you are having troubles in the evenings, at night, or on weekends. Please call if you experience increasing abdominal pain, nausea, vomiting, increasing drainage from your wounds, chills, or fever >101.5    If you have to go to the Emergency Room, we prefer you go to the hospital that did your surgery. Please let them know that you had bariatric surgery and to notify your surgeon.    When should I go back to the clinic?  Follow up with your care team in 1-2 weeks.   If this appointment was not already made, please call: [phone]    Appointments located at [facility] clinic:  [facility]    [address]  [place] [address]    "

## 2024-03-29 NOTE — PROGRESS NOTES
Patient states she is in so much pain that she really wants the stent out. She understands her stricture may not remain open.    Oxycodone and levsin not helping with the pain.     Dr Seals informed of above.

## 2024-03-29 NOTE — BRIEF OP NOTE
[facility]    Brief Operative Note    Pre-operative diagnosis: Dysphagia, unspecified type [R13.10]  Post-operative diagnosis Same as pre-operative diagnosis    Procedure: ESOPHAGOGASTRODUODENOSCOPY, WITH ESOPHAGEAL STENT REMOVAL, N/A - Esophagus    Surgeon: Surgeon(s) and Role:     * Jerome Seals MD - Primary     * Hari Cuellar MD - Assisting  Anesthesia: General   Estimated Blood Loss: Minimal    Drains: None  Specimens: * No specimens in log *  Findings:  Two stents in place, removed. Stricture ring visible through stents.  Complications: None.  Implants:   Implant Name Type Inv. Item Serial No.  Lot No. LRB No. Used Action   STENT ESOPHAGEAL ENDOMAXX [id number]-2310 - [id number] Stent STENT ESOPHAGEAL ENDOMAXX [id number] BHAVESH-2310  [employer] [id number] N/A 1 Explanted   STENT ESOPHAGEAL ENDOMAXX [id number] BHAVESH-2315 - [id number] Stent STENT ESOPHAGEAL ENDOMAXX [id number] BHAVESH-2315  [employer] [id number] N/A 1 Explanted

## 2024-03-29 NOTE — ANESTHESIA PROCEDURE NOTES
Airway       Patient location during procedure: OR       Procedure Start/Stop Times: 3/29/2024 1:54 PM  Staff -        Anesthesiologist:  Brent Castle MD       Resident/Fellow: Christiano Echeverria MD       Performed By: resident  Consent for Airway        Urgency: elective  Indications and Patient Condition       Indications for airway management: anand-procedural       Induction type:RSI       Mask difficulty assessment: 0 - not attempted    Final Airway Details       Final airway type: endotracheal airway       Successful airway: ETT - single  Endotracheal Airway Details        ETT size (mm): 7.0       Cuffed: yes       Successful intubation technique: video laryngoscopy       VL Blade Size: MAC 3       Grade View of Cords: 1       Adjucts: stylet       Position: Right       Measured from: lips       Secured at (cm): 21       Bite block used: None    Post intubation assessment        Placement verified by: capnometry, equal breath sounds and chest rise        Number of attempts at approach: 1       Number of other approaches attempted: 0       Secured with: tape       Ease of procedure: easy       Dentition: Intact    Medication(s) Administered   Medication Administration Time: 3/29/2024 1:54 PM

## 2024-03-29 NOTE — LETTER
[facility] POST ANESTHESIA CARE UNIT  [address] [address]  Phone: [phone]    March 29, 2024        Petty Hutchinson  [address]  [place] [address]          To whom it may concern:    RE: Petty Hutchinson    This patient was seen and treated at our hospital on 3/29/2024. She should not resume work for one week. She may resume work again on 4/6/2024. Any questions can be directed to the surgery department at [facility].    Please contact me for questions or concerns.      Sincerely,      Jerome Seals MD

## 2024-03-29 NOTE — OP NOTE
"[facility]    Operative Note    Pre-operative diagnosis: Dysphagia, unspecified type [R13.10]   Post-operative diagnosis * No post-op diagnosis entered *   Procedure: Procedure(s):  ESOPHAGOGASTRODUODENOSCOPY, WITH ESOPHAGEAL STENT REMOVAL  Prior sleeve gastrectomy; prior stent placement   Surgeon: Surgeon(s) and Role:     * Jerome Seals MD - Primary     * Hari Cuellar MD - Assisting   Anesthesia: General    Estimated blood loss: Minimal   Drains: None   Specimens: * No specimens in log *   Findings: None.  1. An esophageal stent was found at the location of the proximal stomach to distal stomach, a second stent was inside the first stent.  2. The stent was removed by grasping the loop and pulling the stent retrograde under direct endoscopic visualization. The second stent withdrew with the first stent until the level of the deep oropharynx. The gastroscope was replaced and the second stent was removed by grasping the stent and pulling retrograde under direct endoscopic visulaizaiton.  3. I visualized the area of the stent and the esophagus after stent removal and there was no evidence of esophageal injury.    See picture of removed stents at end of this report.     Complications: None.   Implants: None.       COMORBIDITIES: History reviewed. No pertinent past medical history.    INDICATIONS FOR PROCEDURE  Petty Hutchinson is a 23 year old female who underwent prior sleeve gastrectomy surgery in [facility] in December 2023.     An esophageal stent was previously placed to treat gastrointestinal stricture.    Has had two balloon dilatations and now stent placement, which she tolerated for 3 days total.    Height: 177.8 cm (5' 10\"), Weight: 117.7 kg (259 lb 7.7 oz), and currently the Body mass index is 37.23 kg/m .    After understanding the risks and benefits of proceeding with an esophageal stent placement, she agreed to an operation as outlined by me.    I " reviewed the risks of surgery with Petty Hutchinson and these include but are not limited to the following:    POTENTIAL COMPLICATIONS  Complications have been reported in the literature for esophageal stent removal.     PROCEDURAL COMPLICATIONS:   Bleeding   Esophageal or stomach perforation   Pain   Aspiration   Failure of stent to fix the problem intended   Reflux   Esophagitis   Esophageal ulceration   Edema   Fever   Fistula formation outside of normal disease progression   Death with cause outside of normal disease progression    DESCRIPTION OF PROCEDURE:   The patient was brought to the operating room, placed supine on the operating room table, and general anesthesia was induced.    A timeout was conducted with all members of the surgical team present to verify that the procedure and patient were correct.    The procedure was started by intubating the esophagus with a 10-mm adult endoscope.     The scope was advanced into the  gastric sleeve.     Two esophageal stents were located and traversed, the second stent overlapped the first.    I elected to grasp the loop and in this manner, I disengaged the stent from the adjacent mucosa and then I pulled the stent in a controlled movement retrograde, deflating the ETT cuff before pulling it out of the esophagus and ultimately out of the mouth.    The stent was intact.    The gastroscope was passed back into the distal oropharynx and the second stent was located and then I pulled the stent in a controlled movement retrograde, again deflating the ETT cuff, and ultimately out of the mouth.    The stent was intact.    The gastroscope was passed back into the esophagus to evaluate for injury.    I evaluated the location of the previously placed stent and it appeared to have mild stricture.    The gastroscope was removed and the procedure was complete.    I was present for all critical portions of the operation.    Jerome Seals MD  Surgery  [phone] [facility]  )  [phone] (clinic nurses)    Note originally prepared by,   Hari Cuellar MD  [facility] Resident, General Surgery  Emergency General Surgery Service

## 2024-03-29 NOTE — ANESTHESIA CARE TRANSFER NOTE
Patient: Petty Hutchinson    Procedure: Procedure(s):  ESOPHAGOGASTRODUODENOSCOPY, WITH ESOPHAGEAL STENT REMOVAL       Diagnosis: Dysphagia, unspecified type [R13.10]  Diagnosis Additional Information: No value filed.    Anesthesia Type:   General     Note:    Oropharynx: oropharynx clear of all foreign objects and spontaneously breathing  Level of Consciousness: awake  Oxygen Supplementation: face mask  Level of Supplemental Oxygen (L/min / FiO2): 5  Independent Airway: airway patency satisfactory and stable  Dentition: dentition unchanged  Vital Signs Stable: post-procedure vital signs reviewed and stable  Report to RN Given: handoff report given  Patient transferred to: PACU    Handoff Report: Identifed the Patient, Identified the Reponsible Provider, Reviewed the pertinent medical history, Discussed the surgical course, Reviewed Intra-OP anesthesia mangement and issues during anesthesia, Set expectations for post-procedure period and Allowed opportunity for questions and acknowledgement of understanding      Vitals:  Vitals Value Taken Time   /54 03/29/24 1445   Temp     Pulse 50 03/29/24 1448   Resp 17 03/29/24 1448   SpO2 100 % 03/29/24 1448   Vitals shown include unfiled device data.    Electronically Signed By: Christiano Echeverria MD  March 29, 2024  2:49 PM

## 2024-03-29 NOTE — ANESTHESIA POSTPROCEDURE EVALUATION
Patient: Petty Hutchinson    Procedure: Procedure(s):  ESOPHAGOGASTRODUODENOSCOPY, WITH ESOPHAGEAL STENT REMOVAL       Anesthesia Type:  General    Note:  Disposition: Outpatient   Postop Pain Control: Uneventful            Sign Out: Well controlled pain   PONV: No   Neuro/Psych: Uneventful            Sign Out: Acceptable/Baseline neuro status   Airway/Respiratory: Uneventful            Sign Out: Acceptable/Baseline resp. status   CV/Hemodynamics: Uneventful            Sign Out: Acceptable CV status; No obvious hypovolemia; No obvious fluid overload   Other NRE: NONE   DID A NON-ROUTINE EVENT OCCUR? No           Last vitals:  Vitals Value Taken Time   /84 03/29/24 1500   Temp 36.7  C (98  F) 03/29/24 1445   Pulse 47 03/29/24 1509   Resp 13 03/29/24 1509   SpO2 99 % 03/29/24 1509   Vitals shown include unfiled device data.    Electronically Signed By: Brent Castle MD  March 29, 2024  3:09 PM

## 2024-04-01 ENCOUNTER — PATIENT OUTREACH (OUTPATIENT)
Dept: ENDOCRINOLOGY | Facility: CLINIC | Age: 24
End: 2024-04-01

## 2024-04-04 ENCOUNTER — VIRTUAL VISIT (OUTPATIENT)
Dept: SURGERY | Facility: CLINIC | Age: 24
End: 2024-04-04
Payer: COMMERCIAL

## 2024-04-04 VITALS — BODY MASS INDEX: 37.08 KG/M2 | HEIGHT: 70 IN | WEIGHT: 259 LBS

## 2024-04-04 DIAGNOSIS — K31.89 GASTRIC ANASTOMOTIC STRICTURE: ICD-10-CM

## 2024-04-04 DIAGNOSIS — R10.13 EPIGASTRIC PAIN: Primary | ICD-10-CM

## 2024-04-04 DIAGNOSIS — K92.9 GASTRIC ANASTOMOTIC STRICTURE: ICD-10-CM

## 2024-04-04 PROCEDURE — 99213 OFFICE O/P EST LOW 20 MIN: CPT | Mod: 95 | Performed by: SURGERY

## 2024-04-04 ASSESSMENT — PAIN SCALES - GENERAL: PAINLEVEL: NO PAIN (0)

## 2024-04-04 NOTE — PROGRESS NOTES
"Petty Hutchinson is a 23 year old who is being evaluated via a billable video visit.      How would you like to obtain your AVS? MyChart  If the video visit is dropped, the invitation should be resent by: Text to cell phone: [phone]  Will anyone else be joining your video visit? No  If patient encounters technical issues they should call [phone]    During this virtual visit the patient is located in [place], patient verifies this as the location during the entirety of this visit.     Video-Visit Details  Video Start Time:  1130    Type of service:  Video Visit    Video End Time: 1150    Originating Location (pt. Location): Home    Distant Location (provider location):  On-site    Platform used for Video Visit: Rima Gutiérrez, OTIS 4/4/2024      10:29 AM          I had the pleasure of seeing your patient, Petty Hutchinson, in my post-bariatric assessment clinic.    As you know, she is morbidly obese and underwent laparoscopic sleeve gastrectomy surgery.      Had severe stricture through sleeve; dilated twice with balloon; then stented last week and tolerated stent for 3 days; was removed last week.    Petty continues to have trouble with tolerating water; however, can eat ground beef from tacos.    Does take in some protein water.    Seems like she is getting in around 32 oz water daily.    Was asking about additional IVF infusions.      Most recent weights:  Wt Readings from Last 4 Encounters:   04/04/24 117.5 kg (259 lb)   03/29/24 117.7 kg (259 lb 7.7 oz)   03/26/24 117.7 kg (259 lb 7.7 oz)   08/15/13 79.9 kg (176 lb 3.2 oz) (>99%, Z= 2.43)*     * Growth percentiles are based on CDC (Girls, 2-20 Years) data.       Currently, her Body mass index is 37.16 kg/m .    ACTIVE MEDICAL PROBLEMS    Patient Active Problem List   Diagnosis    Obesity    Abdominal pain    Post-op pain       PHYSICAL EXAMINATION  Ht 1.778 m (5' 10\")   Wt 117.5 kg (259 lb)   LMP 02/07/2024   BMI 37.16 kg/m     Body mass index is 37.16 kg/m . "   NAD NCAT  Respiratory: breathing unlabored  Rest of exam deferred due to virtual visit      Current adverse symptoms are: continued swallowing challenges with water       Bowel and bladder habits: normal    Recent hospitalizations/UR/Urgent care: none in last week.    Fluid intake: enough oz per day    Tolerating regular texture foods: a few; not most.      Special testing: none now    PLAN  1. Follow-up: 3 months.   2. Fluid intake should be at least 64 oz each day.  3. Vitamins should include mvi bid, calcium citrate with vitamin D 2828-5833 mg elemental Calcium qday.  vitamin B12.  We reviewed these in clinic today.    4. Reviewed and emphasized 64 oz water daily, which may be twice current intake.  This should reduce patient's need for future IVF.  5. Wouldn't use stents again; might need additional dilatations, which were simpler for [place] to tolerate.    No orders of the defined types were placed in this encounter.        If you have any questions about our plans, please don't hesitate to contact me.     Sincerely,      Jerome Seals MD  Surgery  [phone] (hospital )  [phone] (clinic nurses)                    Main follow-up parameters for all bariatric patients (non-band):   1. Follow-up interval during the first year: ~1 week, 1 month, 3, 6, 9, 12 months.  Every 6 months until 5 years; annually thereafter.  The goal of follow-up sessions is to ensure that food intake behavior (choice of food and eating speed) and exercise/activity level are set appropriately.  2. Yearly lab tests ordered by our clinic.   3. The bariatric team should be aware and potentially evaluate all adverse gastrointestinal symptoms (dysphagia, abdominal pain, nausea, vomiting, diarrhea, heartburn, reflux, etc), which can be a sign of complication.  The bariatric surgeons perform general surgery procedures in addition to bariatric surgery (laparoscopic cholecystectomy, etc.)  4. Inability to tolerate textured food  (chicken, steak, fish, eggs, beans) is NOT normal and may need to be evaluated by endoscopy performed by the bariatric surgeon.   5. All non-band patients should supplement with mvi bid (flintstones or equivalent), calcium citrate with vitamin D, 1200+ mg/day, and vitamin B12 1000 mcg IM q month (or 1000 mcg SL qday).  6. All duodenal switch (biliopancreatic diversion with duodenal switch) patients require additional fat soluble vitamin supplementation (3 ADEK tablets every day; each contains vitamin A - 5667 IU; vitamin D - 400 IU; vitamin E - 150 IU; and vitamin K - 150 mcg)

## 2024-04-04 NOTE — LETTER
"4/4/2024       RE: Petty Hutchinson  [address]  [place] [address]     Dear Colleague,    Thank you for referring your patient, Petty Hutchinson, to the [facility] [place] [facility]. Please see a copy of my visit note below.    Petty Hutchinson is a 23 year old who is being evaluated via a billable video visit.      How would you like to obtain your AVS? MyChart  If the video visit is dropped, the invitation should be resent by: Text to cell phone: [phone]  Will anyone else be joining your video visit? No  If patient encounters technical issues they should call [phone]    During this virtual visit the patient is located in [place], patient verifies this as the location during the entirety of this visit.       I had the pleasure of seeing your patient, Petty Hutchinson, in my post-bariatric assessment clinic.    As you know, she is morbidly obese and underwent laparoscopic sleeve gastrectomy surgery.      Had severe stricture through sleeve; dilated twice with balloon; then stented last week and tolerated stent for 3 days; was removed last week.    Petty continues to have trouble with tolerating water; however, can eat ground beef from tacos.    Does take in some protein water.    Seems like she is getting in around 32 oz water daily.    Was asking about additional IVF infusions.      Most recent weights:  Wt Readings from Last 4 Encounters:   04/04/24 117.5 kg (259 lb)   03/29/24 117.7 kg (259 lb 7.7 oz)   03/26/24 117.7 kg (259 lb 7.7 oz)   08/15/13 79.9 kg (176 lb 3.2 oz) (>99%, Z= 2.43)*     * Growth percentiles are based on CDC (Girls, 2-20 Years) data.       Currently, her Body mass index is 37.16 kg/m .    ACTIVE MEDICAL PROBLEMS    Patient Active Problem List   Diagnosis    Obesity    Abdominal pain    Post-op pain       PHYSICAL EXAMINATION  Ht 1.778 m (5' 10\")   Wt 117.5 kg (259 lb)   LMP 02/07/2024   BMI 37.16 kg/m     Body " mass index is 37.16 kg/m .   NAD NCAT  Respiratory: breathing unlabored  Rest of exam deferred due to virtual visit      Current adverse symptoms are: continued swallowing challenges with water       Bowel and bladder habits: normal    Recent hospitalizations/UR/Urgent care: none in last week.    Fluid intake: enough oz per day    Tolerating regular texture foods: a few; not most.      Special testing: none now    PLAN  1. Follow-up: 3 months.   2. Fluid intake should be at least 64 oz each day.  3. Vitamins should include mvi bid, calcium citrate with vitamin D 9450-6791 mg elemental Calcium qday.  vitamin B12.  We reviewed these in clinic today.    4. Reviewed and emphasized 64 oz water daily, which may be twice current intake.  This should reduce patient's need for future IVF.  5. Wouldn't use stents again; might need additional dilatations, which were simpler for [place] to tolerate.    No orders of the defined types were placed in this encounter.        If you have any questions about our plans, please don't hesitate to contact me.     Sincerely,      Jerome Seals MD  Surgery  [phone] (hospital )  [phone] (clinic nurses)                    Main follow-up parameters for all bariatric patients (non-band):   1. Follow-up interval during the first year: ~1 week, 1 month, 3, 6, 9, 12 months.  Every 6 months until 5 years; annually thereafter.  The goal of follow-up sessions is to ensure that food intake behavior (choice of food and eating speed) and exercise/activity level are set appropriately.  2. Yearly lab tests ordered by our clinic.   3. The bariatric team should be aware and potentially evaluate all adverse gastrointestinal symptoms (dysphagia, abdominal pain, nausea, vomiting, diarrhea, heartburn, reflux, etc), which can be a sign of complication.  The bariatric surgeons perform general surgery procedures in addition to bariatric surgery (laparoscopic cholecystectomy, etc.)  4. Inability to  tolerate textured food (chicken, steak, fish, eggs, beans) is NOT normal and may need to be evaluated by endoscopy performed by the bariatric surgeon.   5. All non-band patients should supplement with mvi bid (flintstones or equivalent), calcium citrate with vitamin D, 1200+ mg/day, and vitamin B12 1000 mcg IM q month (or 1000 mcg SL qday).  6. All duodenal switch (biliopancreatic diversion with duodenal switch) patients require additional fat soluble vitamin supplementation (3 ADEK tablets every day; each contains vitamin A - 5667 IU; vitamin D - 400 IU; vitamin E - 150 IU; and vitamin K - 150 mcg)      Again, thank you for allowing me to participate in the care of your patient.      Sincerely,    Jerome Seals MD

## 2024-04-04 NOTE — NURSING NOTE
"Chief Complaint   Patient presents with    RECHECK     Post upper endoscopy visit       Vitals:    04/04/24 1029   Weight: 117.5 kg (259 lb)   Height: 1.778 m (5' 10\")       Body mass index is 37.16 kg/m .                          Ashutosh Gutiérrez, EMT    "

## 2024-04-15 ENCOUNTER — TELEPHONE (OUTPATIENT)
Dept: SURGERY | Facility: CLINIC | Age: 24
End: 2024-04-15
Payer: COMMERCIAL

## 2024-04-15 NOTE — TELEPHONE ENCOUNTER
Left Voicemail (1st Attempt) for the patient to call back and schedule the following:    Appointment type: Video Visit Return / UMP Return (if in person)  Provider: Dr. Seals  Return date: 3 months (sometime around 7/11/24). Can be video or in person  Specialty phone number: [phone]  Additional appointment(s) needed: n/a  Additonal Notes: per checkout note from Dr. Seals on 4/4/24    Left CC#

## 2024-11-18 ENCOUNTER — TRANSFERRED RECORDS (OUTPATIENT)
Dept: MULTI SPECIALTY CLINIC | Facility: CLINIC | Age: 24
End: 2024-11-18

## 2024-11-18 LAB — CHLAMYDIA - HIM PATIENT REPORTED: NORMAL

## 2025-07-14 ENCOUNTER — TELEPHONE (OUTPATIENT)
Dept: ENDOCRINOLOGY | Facility: CLINIC | Age: 25
End: 2025-07-14
Payer: COMMERCIAL

## 2025-07-14 NOTE — TELEPHONE ENCOUNTER
Order/Referral Request    Who is requesting: patient    Orders being requested: IV Fluids    Reason service is needed/diagnosis: fatigue, headaches daily, vomiting    When are orders needed by: asap    Has this been discussed with Provider: No    Does patient have a preference on a Group/Provider/Facility? [facility] or any location.    Does patient have an appointment scheduled?: Yes: 7/17    Where to send orders: Place orders within Epic    Okay to leave a detailed message?: Yes at Cell number on file:    Telephone Information:   Mobile [phone]

## 2025-07-14 NOTE — TELEPHONE ENCOUNTER
Patient requesting I IV fluids.  Patient has not been seen in over a year and would need evaluation.  Advised patient to be evaluated in ED if she feels she is dehydrated.    Offered to schedule an appointment with [facility] to re-establish care as a bariatric patient.  Patient declined at this time.

## 2025-07-16 ENCOUNTER — PRE VISIT (OUTPATIENT)
Dept: SURGERY | Facility: CLINIC | Age: 25
End: 2025-07-16
Payer: COMMERCIAL

## 2025-07-16 NOTE — TELEPHONE ENCOUNTER
Patient Telephone Reminder Call    Date of call:  07/16/25  Phone numbers:  Cell number on file:    Telephone Information:   Mobile [phone]     Reached patient/confirmed appointment:  No, left message on voicemail  Appointment with:   Dr. Jerome Seals  Reason for visit:  Return Bariatrics.  Remind patient that this visit is a consultation only, NO procedure:  N/A  Can this visit be changed to a video visit:  N/A

## 2025-07-21 ENCOUNTER — HOSPITAL ENCOUNTER (OUTPATIENT)
Facility: CLINIC | Age: 25
Setting detail: OBSERVATION
Discharge: HOME | End: 2025-07-22
Attending: EMERGENCY MEDICINE | Admitting: PSYCHIATRY & NEUROLOGY
Payer: COMMERCIAL

## 2025-07-21 DIAGNOSIS — E66.9 OBESITY: ICD-10-CM

## 2025-07-21 DIAGNOSIS — F41.9 ANXIETY: ICD-10-CM

## 2025-07-21 DIAGNOSIS — F33.2 SEVERE EPISODE OF RECURRENT MAJOR DEPRESSIVE DISORDER, WITHOUT PSYCHOTIC FEATURES (H): ICD-10-CM

## 2025-07-21 PROCEDURE — G0378 HOSPITAL OBSERVATION PER HR: HCPCS

## 2025-07-21 PROCEDURE — 250N000013 HC RX MED GY IP 250 OP 250 PS 637

## 2025-07-21 PROCEDURE — 99223 1ST HOSP IP/OBS HIGH 75: CPT

## 2025-07-21 PROCEDURE — 99285 EMERGENCY DEPT VISIT HI MDM: CPT

## 2025-07-21 PROCEDURE — 99284 EMERGENCY DEPT VISIT MOD MDM: CPT | Performed by: EMERGENCY MEDICINE

## 2025-07-21 RX ORDER — ACETAMINOPHEN 325 MG/1
650 TABLET ORAL EVERY 4 HOURS PRN
Status: DISCONTINUED | OUTPATIENT
Start: 2025-07-21 | End: 2025-07-22 | Stop reason: HOSPADM

## 2025-07-21 RX ORDER — TRAZODONE HYDROCHLORIDE 50 MG/1
50 TABLET ORAL
Status: DISCONTINUED | OUTPATIENT
Start: 2025-07-21 | End: 2025-07-22 | Stop reason: HOSPADM

## 2025-07-21 RX ORDER — OLANZAPINE 5 MG/1
5-10 TABLET, ORALLY DISINTEGRATING ORAL 3 TIMES DAILY PRN
Status: DISCONTINUED | OUTPATIENT
Start: 2025-07-21 | End: 2025-07-22 | Stop reason: HOSPADM

## 2025-07-21 RX ORDER — HYDROXYZINE HYDROCHLORIDE 25 MG/1
25 TABLET, FILM COATED ORAL EVERY 4 HOURS PRN
Status: DISCONTINUED | OUTPATIENT
Start: 2025-07-21 | End: 2025-07-22 | Stop reason: HOSPADM

## 2025-07-21 RX ORDER — IBUPROFEN 600 MG/1
600 TABLET, FILM COATED ORAL EVERY 6 HOURS PRN
Status: DISCONTINUED | OUTPATIENT
Start: 2025-07-21 | End: 2025-07-21

## 2025-07-21 RX ORDER — ESCITALOPRAM OXALATE 5 MG/1
5 TABLET ORAL DAILY
Status: DISCONTINUED | OUTPATIENT
Start: 2025-07-22 | End: 2025-07-22 | Stop reason: HOSPADM

## 2025-07-21 RX ADMIN — TRAZODONE HYDROCHLORIDE 50 MG: 50 TABLET ORAL at 22:54

## 2025-07-21 RX ADMIN — HYDROXYZINE HYDROCHLORIDE 25 MG: 25 TABLET, FILM COATED ORAL at 17:18

## 2025-07-21 ASSESSMENT — ACTIVITIES OF DAILY LIVING (ADL)
ADLS_ACUITY_SCORE: 44

## 2025-07-21 NOTE — ED NOTES
[facility]  ED to EMPATH Checklist:      Goal for EMPATH: Depression management, Suicidality,  Referral and resources, and Time to stabilize    Current Behavior: Sad    Safety Concerns: Suicidal, no plan    Legal Hold Status: Voluntary    Medically Cleared by ED provider: Yes    Patient Therapeutically Searched: Therapeutic search by ED staff (strings, belts, shoes, pockets, electronics, etc.)    Belongings: Remain with patient    Independent Ambulation at Baseline: Yes/No: Yes    Participates in Care/Conversation: Yes/No: Yes    Patient Informed about EMPATH: Yes/No: Yes    DEC: Ordered and pending    Patient Ready to be Transferred to EMPATH? Yes/No: Yes

## 2025-07-21 NOTE — ED PROVIDER NOTES
Emergency Department Note      History of Present Illness     Chief Complaint   Suicidal and Depression      HPI   Petty Hutchinson is a 25 year old female who comes in with 2 weeks of depression symptoms and the last 3 days started feeling suicidal.  She does not have a plan.  She is not sleeping very well and said that her mind races at night.  She is on no medications, has never been seen for depression, does not have a therapist or a psychiatrist.  She called the crisis line and they did a [place] intervention and recommended she come here for further evaluation.  She is here voluntarily.  She denies alcohol use other than on weekends occasionally during the summer and no street drug use.  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  Nothing happened in her life 2 weeks ago to cause this decline.    Independent Historian   None    Review of External Notes   None      Past Medical History     Medical History and Problem List   No past medical history on file.    Medications   acetaminophen (TYLENOL) 325 MG tablet  bisacodyl (DULCOLAX) 10 MG suppository  hyoscyamine (LEVSIN/SL) 0.125 MG sublingual tablet  ondansetron (ZOFRAN ODT) 4 MG ODT tab        Surgical History   Past Surgical History:   Procedure Laterality Date    ABDOMEN SURGERY      gastric sleeve    COMBINED ESOPHAGOSCOPY, GASTROSCOPY, DUODENOSCOPY (EGD), REMOVE ESOPHAGEAL STENT N/A 3/29/2024    Procedure: ESOPHAGOGASTRODUODENOSCOPY, WITH ESOPHAGEAL STENT REMOVAL;  Surgeon: Jerome Seals MD;  Location: [facility] OR    COMBINED ESOPHAGOSCOPY, GASTROSCOPY, DUODENOSCOPY (EGD), REPLACE ESOPHAGEAL STENT N/A 03/26/2024    Procedure: ESOPHAGOGASTRODUODENOSCOPY, WITH WIRE-GUIDED GASTRIC STENT PLACEMENT; 130TVX38LW DISTAL; 260GUW27PS PROXIMAL;  Surgeon: Jerome Seals MD;  Location: [facility] OR       Physical Exam     Patient Vitals for the past 24 hrs:   BP Temp Temp src Pulse Resp SpO2 Height Weight   07/21/25 1543 130/70 97  F (36.1  C) Temporal 78 16  "99 % -- --   07/21/25 1541 -- -- -- -- -- -- 1.778 m (5' 10\") 117.9 kg (260 lb)     Physical Exam  Nursing note and vitals reviewed.  Constitutional:  Appears comfortable.   HENT:    Mucus membranes are moist.  Eyes:    Conjunctivae are normal. No jaundice.  Pupils equal  Cardiovascular:  Normal rate, regular rhythm.      Normal heart sounds.     No murmur heard.  Pulmonary/Chest:  Breath sounds clear. No respiratory distress.     No stridor.   Musculoskeletal: No edema.  Moves all extremities equally.  Neurological:   Alert and appropriate. No focal weakness.  Gait is normal.  Skin:    Skin is warm and dry. No rash noted. No diaphoresis.   Psychiatric:   Patient with flat affect, seems depressed.  Makes good eye contact.  Not delusional or psychotic.      Diagnostics     Lab Results   Labs Ordered and Resulted from Time of ED Arrival to Time of ED Departure - No data to display    Imaging   No orders to display       EKG     Independent Interpretation   None    ED Course      Medications Administered   Medications - No data to display    Procedures   Procedures     Discussion of Management   None    ED Course        Additional Documentation  None    Medical Decision Making / Diagnosis     CMS Diagnoses: None    MIPS   None               MDM   Petty Hutchinson is a 25 year old female with 2 weeks of worsening depression and now suicidal ideation.  The patient is not sleeping well.  Not on any medications at this time no chemical use.  She has not been sick recently.  [facility].    Disposition   The patient was transferred to [facility].     Diagnosis     ICD-10-CM    1. Depression with suicidal ideation  F32.A     R45.851            Discharge Medications   New Prescriptions    No medications on file         MD Adam Abebe Tracy Alan, MD  07/21/25 1559    "

## 2025-07-21 NOTE — ED TRIAGE NOTES
Pt has been depressed for 2 weeks and today has started to feel suicidal   Pt was told to come her for the empath unit   Pt is suicidal but does not have a plan   Pt admits to alcohol use but not today

## 2025-07-21 NOTE — ED PROVIDER NOTES
"EmPATH Unit - Initial Psychiatric Observation Note  [facility] Emergency Department  Observation Initiation Date: Jul 21, 2025    Petty Hutchinson MRN: [id number]   Age: 25 year old YOB: 2000     History     Chief Complaint   Patient presents with    Suicidal    Depression     HPI  Petty Hutchinson is a 25 year old female with remote hx of ADHD and adjustment disorder who comes in with worsening depression and suicidal ideation. Medical hx notable for sleeve gastrectomy in 2023. 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 She is not sleeping very well and said that her mind races at night. She is not taking any psychiatric medications. She called the crisis line and [facility] and recommended she come here for further evaluation. In the emergency department, this patient was determined to be medically stable and transferred to the EmPATH unit for psychiatric assessment.  Record review indicates no prior inpatient mental health hospitalizations or suicide attempts.  Patient nearing 3 hours at the EMPATH unit.     On examination today, patient reports ongoing depressive symptoms for the past two years follow gastric bypass surgery that have worsened in the past two weeks with no clear trigger. She endorses ongoing difficulties in appetite and nutrition leading to fatigue and dizziness. She reports passive suicidal thoughts with no intent or plan. She also endorses increase in alcohol use on the weekends, typically consuming a pint of tequila followed by additional shots with friends. She reports that alcohol use serves as a coping mechanism for emotional distress and anxiety. She denies daily use and withdrawal symptoms. Her last alcohol use was last weekend. Sleep has been poor, \"up all night and getting 5 hours of sleep daily\". She became tearful during the interview when asked about what keeps her up at night and replied, \"worry about my future and life\". She tried melatonin with no significant benefit. No " "prior psychiatric medication hx. Discussed consideration for initiation of antidepressant medication. Patient is open to starting lexpro to target both depressive and anxiety symptoms. We also discussed hydroxyzine and trazodone for sleep. She expressed interest in staying on observation tonight. She is  also interest in referral to therapy and psych medication provider.     Past Medical History  No past medical history on file.  Past Surgical History:   Procedure Laterality Date    ABDOMEN SURGERY      gastric sleeve    COMBINED ESOPHAGOSCOPY, GASTROSCOPY, DUODENOSCOPY (EGD), REMOVE ESOPHAGEAL STENT N/A 3/29/2024    Procedure: ESOPHAGOGASTRODUODENOSCOPY, WITH ESOPHAGEAL STENT REMOVAL;  Surgeon: Jerome Seals MD;  Location: [facility] OR    COMBINED ESOPHAGOSCOPY, GASTROSCOPY, DUODENOSCOPY (EGD), REPLACE ESOPHAGEAL STENT N/A 03/26/2024    Procedure: ESOPHAGOGASTRODUODENOSCOPY, WITH WIRE-GUIDED GASTRIC STENT PLACEMENT; 117LUA15OW DISTAL; [id number] PROXIMAL;  Surgeon: Jerome Seals MD;  Location: [facility] OR     acetaminophen (TYLENOL) 325 MG tablet  bisacodyl (DULCOLAX) 10 MG suppository  hyoscyamine (LEVSIN/SL) 0.125 MG sublingual tablet  ondansetron (ZOFRAN ODT) 4 MG ODT tab      No Known Allergies  Family History  No family history on file.  Social History   Social History     Tobacco Use    Smoking status: Never   Substance Use Topics    Alcohol use: No    Drug use: No          Review of Systems  A medically appropriate review of systems was performed with pertinent positives and negatives noted in the HPI, and all other systems negative.    Physical Examination   BP: 130/70  Pulse: 78  Temp: 97  F (36.1  C)  Resp: 16  Height: 177.8 cm (5' 10\")  Weight: 117.9 kg (260 lb)  SpO2: 99 %    Physical Exam  General: Appears stated age.   Neuro: Alert and fully oriented. Extremities appear to demonstrate normal strength on visual inspection.   Integumentary/Skin: no rash visualized, normal color    Psychiatric " Examination   Appearance: awake, alert and adequately groomed  Attitude:  cooperative  Eye Contact:  good  Mood:  sad   Affect:  appropriate and in normal range and mood congruent  Speech:  clear, coherent  Psychomotor Behavior:  no evidence of tardive dyskinesia, dystonia, or tics  Thought Process:  logical, linear, and goal oriented  Associations:  no loose associations  Thought Content:  no evidence of suicidal ideation or homicidal ideation  Insight:  good  Judgement:  intact  Oriented to:  time, person, and place  Attention Span and Concentration:  intact  Recent and Remote Memory:  intact  Language: able to name/identify objects without impairment  Fund of Knowledge: intact with awareness of current and past events    ED Course        Labs Ordered and Resulted from Time of ED Arrival to Time of ED Departure - No data to display    Assessments & Plan (with Medical Decision Making)   Patient presenting with increase in depressive symptoms over the last two weeks leading to suicidal thoughts further exacerbated by medical symptoms due to gastric bypass surgery.  She has never trialed any medications for depression. Treatment plan focused on initiating an antidepressant  and staying on observation for stabilization of symptoms. Nursing notes reviewed noting no acute issues.     I have reviewed the assessment completed by the [facility].     During the observation period, the patient did not require medications for agitation, and did not require restraints/seclusion for patient and/or provider safety.     The patient was found to have a psychiatric condition that would benefit from an observation stay in the emergency department for further psychiatric stabilization and/or coordination of a safe disposition. The observation plan includes serial assessments of psychiatric condition, potential administration of medications if indicated, further disposition pending the patient's psychiatric course during the monitoring  period.     Preliminary diagnosis:    ICD-10-CM    1. Severe episode of recurrent major depressive disorder, without psychotic features (H)  F33.2       2. Anxiety  F41.9            Treatment Plan:  - Start Lexpro 5mg daily for the treatment of depressive symptoms  - Hydroxyzine 25mg every 4 hours as needed for anxiety and Trazodone 50mg at bedtime as needed for sleep   - EmPATH standing order medications available   - Medication education provided this visit including but not limited to: Rationale for medication, importance of medication adherence, medication interactions, common medication side effects, benefits of medications.  - Referral to outpatient medication management and psychotherapy providers following additional stabilization   - Remain at [facility] under observation with reassessment tomorrow  --  DIANA VILLARREAL   [facility] EMERGENCY DEPT  EmPATH Unit        Physician Attestation   I, TOMY Schultz CNP, was present with the nurse practitioner student who participated in the service and in the documentation of the note. I have verified the history and personally performed the MSE and medical decision making.  I have reviewed all vitals and laboratory findings. I agree with the assessment, have added relevant comments and adjustments to plan of care as documented in the note. TOMY Schultz, CNP, July 21, 2025        Elenita Merida APRN CNP  07/21/25 7855

## 2025-07-21 NOTE — PROGRESS NOTES
Do you make yourself Sick because you feel uncomfortably full? No.     Do you worry you have lost Control over how much you eat? No    Have you recently lost One stone (14 lbs.) in a 3-month period? No    Do you believe yourself to be Fat (overweight) when others say you are too thin? No.    Would you say that Food dominates your life? Yes.      Score: 1

## 2025-07-21 NOTE — PROGRESS NOTES
Petty is a 25 year old female with no history of mental health. received from ED due to increase suicide ideation with no plan. She started feeling depressed about 2 weeks ago, but 2 days ago her Suicidal thought has intensify,  and she is been crying with no stop for the past two days.Pt currently lives with her parents, she feels she not being supported. She is looking to starts medication, and therapy. Nursing and risk assessments completed. Assessments reviewed with LMHP and physician. Admission information reviewed with patient. Patient given a tour of [facility] and instructions on using the facility. Questions regarding [facility] addressed. Pt safety search completed.

## 2025-07-22 VITALS
TEMPERATURE: 97.2 F | HEIGHT: 70 IN | DIASTOLIC BLOOD PRESSURE: 70 MMHG | RESPIRATION RATE: 18 BRPM | SYSTOLIC BLOOD PRESSURE: 128 MMHG | HEART RATE: 78 BPM | BODY MASS INDEX: 36.42 KG/M2 | WEIGHT: 254.4 LBS | OXYGEN SATURATION: 98 %

## 2025-07-22 PROCEDURE — G0378 HOSPITAL OBSERVATION PER HR: HCPCS

## 2025-07-22 PROCEDURE — 250N000013 HC RX MED GY IP 250 OP 250 PS 637

## 2025-07-22 PROCEDURE — 99239 HOSP IP/OBS DSCHRG MGMT >30: CPT | Performed by: NURSE PRACTITIONER

## 2025-07-22 RX ORDER — ESCITALOPRAM OXALATE 5 MG/1
5 TABLET ORAL DAILY
Qty: 30 TABLET | Refills: 0 | Status: SHIPPED | OUTPATIENT
Start: 2025-07-22

## 2025-07-22 RX ORDER — HYDROXYZINE PAMOATE 50 MG/1
50 CAPSULE ORAL 3 TIMES DAILY PRN
Qty: 30 CAPSULE | Refills: 0 | Status: SHIPPED | OUTPATIENT
Start: 2025-07-22

## 2025-07-22 RX ORDER — TRAZODONE HYDROCHLORIDE 50 MG/1
50 TABLET ORAL
Qty: 30 TABLET | Refills: 0 | Status: SHIPPED | OUTPATIENT
Start: 2025-07-22

## 2025-07-22 RX ADMIN — ESCITALOPRAM OXALATE 5 MG: 5 TABLET, FILM COATED ORAL at 10:20

## 2025-07-22 ASSESSMENT — COLUMBIA-SUICIDE SEVERITY RATING SCALE - C-SSRS
SUICIDE, SINCE LAST CONTACT: NO
TOTAL  NUMBER OF INTERRUPTED ATTEMPTS SINCE LAST CONTACT: NO
1. SINCE LAST CONTACT, HAVE YOU WISHED YOU WERE DEAD OR WISHED YOU COULD GO TO SLEEP AND NOT WAKE UP?: NO
2. HAVE YOU ACTUALLY HAD ANY THOUGHTS OF KILLING YOURSELF?: NO
TOTAL  NUMBER OF ABORTED OR SELF INTERRUPTED ATTEMPTS SINCE LAST CONTACT: NO
6. HAVE YOU EVER DONE ANYTHING, STARTED TO DO ANYTHING, OR PREPARED TO DO ANYTHING TO END YOUR LIFE?: NO
ATTEMPT SINCE LAST CONTACT: NO

## 2025-07-22 ASSESSMENT — ACTIVITIES OF DAILY LIVING (ADL)
ADLS_ACUITY_SCORE: 44

## 2025-07-22 NOTE — PROGRESS NOTES
"  Triage and Transition Services Extended Care Reassessment     Patient: Petty goes by \"[place],\" uses she/her pronouns  Date of Service: July 22, 2025  Site of Service: [facility] Emergency Dept                             EM[facility]  Patient was seen yes  Mode of Assessment: In person     Reason for Reassessment:  decrease in sx, Pt request.     History of Patient's Original Emergency Room Encounter: Pt shares that she feels like her depression started in 2024 after she had complications from gastric sleeve surgery, requiring hospital admission. She reports that her last depressive episode was a few months ago but that this has been her most intense depressive episode to date. She has no history of suicidal ideation prior to this current episode. Pt denies a history of NSSI or suicide attempts. Per collateral report, pt did receive therapy and psychiatry services as a child. Pt has a close laird with her mother, which is a strong protective factor for pt.    Presentation Summary: Pt presented with a somewhat blunted and depressed affect. Pts mood was congruent with this presentation. Pt was oriented x4 and engaged in assessment. Pt endorsed mood improvement today and was able to safety plan with writer. Pt did not endorse any SI today. Pt endorsed some anxiety sx and also endorsed that she has numbness in her hands in legs. Pt was receptive to discussing coping skills and ways to manage her anxiety. Pt endorsed feeling open to referrals for therapy and psychiatry. Pt does appear motivated to engage in outpatient supports.    Changes Observed Since Initial Assessment: decrease in presenting symptoms, patient/family request    Therapeutic Interventions Provided: Engaged in safety planning, Engaged in guided discovery, explored patient's perspectives and helped expand them through socratic dialogue., Reviewed healthy living that supports positive mental health, including looking at sleep hygiene, regular " movement, nutrition, and regular socialization., Identified and practiced coping skills.    Current Symptoms: anxious sadness, withdrawl/isolation anxious        Mental Status Exam   Affect: Blunted  Appearance: Appropriate  Attention Span/Concentration: Attentive  Eye Contact: Engaged    Fund of Knowledge: Appropriate   Language /Speech Content: Fluent  Language /Speech Volume: Normal  Language /Speech Rate/Productions: Normal  Recent Memory: Intact  Remote Memory: Intact  Mood: Normal  Orientation to Person: Yes   Orientation to Place: Yes  Orientation to Time of Day: Yes  Orientation to Date: Yes     Situation (Do they understand why they are here?): Yes  Psychomotor Behavior: Normal  Thought Content: Clear  Thought Form: Intact    Treatment Objective(s) Addressed: rapport building, processing feelings, safety planning, identifying an appropriate aftercare plan, assessing safety, identifying treatment goals, exploring obstacles to safety in the community, identifying and practicing coping strategies    Patient Response to Interventions: acceptance expressed, verbalizes understanding    Progress Towards Goals:  Patient Reports Symptoms Are: improving  Patient Progress Toward Goals: is making progress  Comment: Pt has been receptive to ED interventions.  Next Step to Work Toward Discharge: symptom stabilization  Symptom Stabilization Comment: Pt did not endorse any SI/SIB/HI or AH/VH.    Case Management: Summary of Interaction: None at time of assessment    C-SSRS Since Last Contact:   1. Wish to be Dead (Since Last Contact): No  2. Non-Specific Active Suicidal Thoughts (Since Last Contact): No     Actual Attempt (Since Last Contact): No  Has subject engaged in non-suicidal self-injurious behavior? (Since Last Contact): No  Interrupted Attempts (Since Last Contact): No  Aborted or Self-Interrupted Attempt (Since Last Contact): No  Preparatory Acts or Behavior (Since Last Contact): No  Suicide (Since Last Contact):  No     Calculated C-SSRS Risk Score (Since Last Contact): No Risk Indicated    Plan: Final Disposition / Recommended Care Path: discharge  Plan for Care reviewed with assigned Medical Provider: yes  Plan for Care Team Review: provider, RN  Patient and/or validated legal guardian concurs: yes  Clinical Substantiation: At this time IP MH admission is not being recommended due to Pt not endorsing any SI/SIB/HI or AH/VH. Pt was able to fully safety plan with writer. During current assessment Pt does not present with any modifiable risk factors that are likely to be mitigated in a hospital setting. Further, current sx and presentation are unlikely to be changed or alleviated by medication management or IP MH therapeutic interventions during a brief hospital stay. Pt does appear to be at higher risk of death by suicide accidental or intentional due to mental health hx.  At this time IP MH admission does not appear to be the most therapeutically beneficial intervention/ least restrictive level of care for Pt. Pt appears to be able to use and motivated to engage in supportive mental health/ community resources. Pt was open to therapy and psychiatry referral.    Legal Status: Legal Status: Voluntary/Patient has signed consent for treatment    Session Status: Time session started: 1220  Time session ended: 1236  Session Duration (minutes): 16 minutes  Session Number: 1  Anticipated number of sessions or this episode of care: 1    Session Start Time: 1220  Session Stop Time: 1236  CPT codes: 37360 - Psychotherapy (with patient) - 30 (16-37*) min  Time Spent: 16 minutes      CPT code(s) utilized: 57682 - Psychotherapy (with patient) - 30 (16-37*) min    Diagnosis:   Patient Active Problem List   Diagnosis Code    Obesity E66.9    Abdominal pain R10.9    Post-op pain G89.18    Anxiety F41.9    Severe episode of recurrent major depressive disorder, without psychotic features (H) F33.2       Primary Problem This Admission: Active  Hospital Problems    Anxiety      Severe episode of recurrent major depressive disorder, without psychotic features (H)        Briana Mccracken, [facility]   Licensed Mental Health Professional (LMHP), [facility]  [phone]

## 2025-07-22 NOTE — PROGRESS NOTES
Infection Prevention Progress Note  7/22/2025      Patient Name: Petty Hutchinson [id number]  Admit Date: 7/21/2025    Infection Status as of 7/22/2025 11:27 AM: No active infections  Isolation Status as of 7/22/2025 11:27 AM: No active isolations     MDRO Discontinuation  Infection Prevention has reviewed this patient's chart per the MDRO D/C Policy and have taken the following action:    Patient meets all the criteria for discontinuation and Infection Prevention will resolve the MRSA infection status.    If you have any questions, please contact Infection Prevention.    Evy Bishop, Infection Prevention

## 2025-07-22 NOTE — DISCHARGE INSTRUCTIONS
A therapy and medication management appointment has been scheduled for you. Please note the patient instructions for each appointment.     Date: Thursday, 7/24/2025  Time: 9:00 am - 10:00 am  Provider: Charity Castanon MA  [facility]  Location: [facility], [address], [place] [address]  Phone: [phone]  Type: Therapy - (In-Person)  Patient instructions  *This provider is a supervisor and works with interns.* Paperwork is available on our portal. Your email is required for us to set up portal access. Please arrive 20 minutes early if you prefer to fill out paper copies of forms. [email or web address] Email: [email or web address]      Date: Friday, 7/25/2025  Time: 11:00 am - 12:00 pm  Provider: Becca Cruz  MSN  APRN,CNP,PMHNP,RN  Location: [facility] [address] [place] [address], [facility] [address], [place] [address]  Phone: [phone]  Type: Medication Mgmt - (In-Person)    Patient instructions  1. All intake paperwork will be completed electronically. 2. Appointment will be confirmed after all intake paperwork is completed. 3. Bring your hospital discharge paperwork on the day of the appointment or [email or web address] Location is close to the main road and bus route. Free parking Bring Insurance and discharge paperwork The office is on the 3rd floor Arrive 10-15 minutes for insurance verification Telehealth- - No driving. You cannot be on public transport e.g city bus    It is your responsibility to contact your insurance company directly to verify coverage, eligibility, payment, and benefit information for any appointments or referrals listed above.   [facility] maintains an extensive network of licensed behavioral health providers to connect patients with the services they need. We do not charge providers a fee to participate in our referral network. We match patients with providers based on a patient's specific treatment needs, insurance coverage, and  location. Our first effort will be to refer you to a provider within your care system and will utilize providers outside your care system as needed.         Mental health recommendations    Please follow-up with your outpatient team about your visit today.    2.  One of our care coordinators will reach out to you after your discharge.  If you have any questions about additional resources please call our coordinators at # [phone]. If you would like to schedule an appointment for therapy or psychiatry  please call our Behavioral Access Scheduling office at [phone].      3.  Please use aftercare plan and already established coping skills and safety planning as needed.     4.  Please call 911 and/or return to the emergency department if her symptoms worsen or your safety becomes compromised. [facility] Adult crisis line, [phone]           The following DBT skills can assist me when: I want to act on your emotions and acting on them will only make things worse, I am overwhelmed by my emotions, I want to try to be skillful and not act on self destructive behavior.     Reduce Extreme Emotion  QUICKLY:  Changing Your Body Chemistry       T:  Change your body Temperature to change your autonomic nervous system    Use Ice pack to calm yourself down FAST. Place ice pack underneath your eyes for a count of 30 seconds to initiate the divers reflex which will naturally calm down your heart rate and breathing.      I:  Intensely exercise to calm down a body revved up by emotion    Examples: running, walking fast, jumping, playing basketball, weight lifting, swimming, calisthenics, etc.      Engage in exercises that DO NOT include violent behaviors. Exercises that utilize violent behaviors tend to function as  behavioral rehearsal,  and rather than calming the person down, may actually  rev  the person up more, increasing the likelihood of violence, and lessening the likelihood that they will  burn off  energy      P:  Progressively relax your muscles    Starting with your hands, moving to your forearms, upper arms, shoulders, neck, forehead, eyes, cheeks and lips, tongue and teeth, chest, upper back, stomach, buttocks, thighs, calves, ankles, feet      Tense (10 seconds,   of the way), then relax each muscle (all the way)    Notice the tension    Notice the difference when relaxed (by tensing first, and then relaxing, you are able to get a more thorough relaxation than by simply relaxing)      P: Paced breathing to relax    The standard technique is to begin with counting the number of steps one takes for a typical inhale, then counting the steps one takes for a typical exhale, and then lengthening the amount of steps for the exhalation by one or two steps.  OR repeat this pattern for 1-2 minutes:   Inhale for four (4) seconds    Exhale for six (6) to eight (8) seconds        After using Distress Tolerance TIPP, TRY TO STOP!     S- Stop    Do not just react on your emotion urge. Stop! Freeze! Do not move a muscle! Your emotions may try to make you act without thinking. Stay in control! Take a step back Take a step back from the situation.    T- Take a break    Let go. Take a deep breath. Do not let your feelings make you act impulsively.    O- Observe    Notice what is going on inside and outside you. What is the situation? What are your thoughts and feelings? What are others saying or doing? Does my emotion make sense, is it justified? What is it that my emotions want me to do? Would that be effective?    P- Proceed mindfully    Act with awareness. In deciding what to do, consider your thoughts and feelings, the situation, and other people s thoughts and feelings. Think about your goals. Ask Wise Mind: Which actions will make it better or worse?        If my emotion action urge would not be effective or helpful, practice acting OPPOSITE to the EMOTION ACTION URGE can help reduce the intensity or even change the emotion.    Consider these examples: with FEAR we have the urge to run away/avoid. OPPOSITE would be to approach it with caution. ANGER we have the urge to attack. OPPOSITE would be to gently avoid or to demonstrate kindness towards it. SADNESS we have the urge to withdraw/isolate. OPPOSITE would be to get self to move and be active physically or socially.      These additional skills may help with self-soothing and distracting you:      Activities   Focus attention on a task you need to get done. Rent movies; watch TV. Clean a room in your house. Find an event to go to. Play computer games. Go walking. Exercise. Surf the Internet. Write e-mails. Play sports. Go out for a meal or eat a favorite food. Call or go out with a friend. Listen to your iPod; download music. Build something. Spend time with your children. Play cards. Read magazines, books, comics. Do crossword puzzles or Sudoku.     Emotions   Read emotional books or stories, old letters. Watch emotional TV shows; go to emotional movies. Listen to emotional music. (Be sure the event creates different emotions.) Ideas: Scary movies, joke books, comedies, funny records, Presybeterian music, soothing music or music that fires you up, going to a store and reading funny greeting cards.     Thoughts   Count to 10; count colors in a painting or poster or out the window; count anything. Repeat words to a song in your mind. Work puzzles. Watch TV or read.     Sensations   Squeeze a rubber ball very hard. Listen to very loud music. Hold ice in your hand or mouth. Go out in the rain or snow. Take a hot or cold shower.   Remember that you can use your 5 senses as helpful self-soothing tools!       I can help my own emotions by practicing the following to keep my emotional mind healthy and bring positive emotions:     The ABC PLEASE skill is about taking good care of ourselves so that we can take care of others. Also, an important component of DBT is to reduce our vulnerability. When  we take good care of ourselves, we are less likely to be vulnerable to disease and emotional crisis.     ABC   A- Accumulate positive emotions by doing things that are pleasant.   B- Build mastery by doing things we enjoy. Whether it is reading, cooking, cleaning, fixing a car, working a cross word puzzle, or playing a musical instrument. Practice these things to  and in time we feel competent.   C- [place] by rehearsing a plan ahead of time so that we can be prepared to cope skillfully. (Think of what makes situations difficult, and what helps in those situations)      PLEASE   Treat Physical Illness and take medications as prescribed.   Balance eating in order to avoid mood swings.   Avoid mood-Altering substances and have mood control.   Maintain good sleep so you can enjoy your life.   Get exercise to maintain high spirits.        Aftercare Plan      If I am feeling unsafe or I am in a crisis, I will:   Contact my established care providers   Call the National Suicide Prevention Lifeline: 295.962.2667   Go to the nearest emergency room   Call 911   Your [facility] has a mental health crisis team you can call 24/7: [facility] Adult, [phone]    Warning signs that I or other people might notice when a crisis is developing for me: crying, feeling bad about self    Things I am able to do on my own to cope or help me feel better:   -Practice square breathing when I begin to feel anxious - in breath through the nose for the count   of 4 and the first line on the square. Out breath through the mouth for the count of 4 for the second line   of the square. Repeat to complete the square. Repeat the square as many times as needed.    Things that I am able to do with others to cope or help me feel better: -Use community resources, including hotline numbers, [facility] crisis and support meetings    Things I can use or do for distraction: -Distraction skills of: going for walks, watching TV, spending time  "outside, calling a friend or family member  -Download a meditation lin and spend 15-20 minutes per day mediating/relaxing. Some apps to   download include: Calm, Headspace and Insight Timer. All 3 of these apps have free version    Changes I can make to support my mental health and wellness:   -Attend scheduled mental health therapy and psychiatric appointments and follow all recommendations  -Maintain a daily schedule/routine  -Practice deep breathing skills  -Abstain from all mood altering chemicals not currently prescribed to me     People in my life that I can ask for help: [employer] [place] [employer] Mental Illness (AARON)  434.303.3391 or 1.888.AARON.HELPS    Other things that are important when I m in crisis: -Commit to 30 minutes of self care daily - this can be as simple as taking a shower, going for a walk, cooking a meal, read, writing, etc        Crisis Lines  Crisis Text Line  Text 869295  You will be connected with a trained live crisis counselor to provide support.    Por espanol, texto  ZOHREH a 401188 o texto a 442-AYUDAME en WhatsApp    National Hope Line  1.800.SUICIDE [3585337]      Community Resources  Fast Tracker  Linking people to mental health and substance use disorder resources  fasttrackermn.org     [place] Mental Health Warm Line  Peer to peer support  Monday thru Saturday, 12 pm to 10 pm  173.573.7962 or 5.183.616.5734  Text \"Support\" to 58295    [employer] [place] on Mental Illness (AARON)  786.096.3147 or 1.888.AARON.HELPS        Mental Health Apps  My3  https://my3app.org/    VirtualHopeBox  https://iStreamPlanet.org/apps/virtual-hope-box/      Additional Information  Today you were seen by a licensed mental health professional through Triage and Transition services, [facility]al Healthcare Providers (BHP)  for a crisis assessment in the Emergency Department at [facility].  It is recommended that you follow up with your established providers (psychiatrist, mental health " therapist, and/or primary care doctor - as relevant) as soon as possible. Coordinators from [facility] will be calling you in the next 24-48 hours to ensure that you have the resources you need.  You can also contact [facility] coordinators directly at [phone]. You may have been scheduled for or offered an appointment with a mental health provider. [facility] maintains an extensive network of licensed behavioral health providers to connect patients with the services they need.  We do not charge providers a fee to participate in our referral network.  We match patients with providers based on a patient's specific needs, insurance coverage, and location.  Our first effort will be to refer you to a provider within your care system, and will utilize providers outside your care system as needed.

## 2025-07-22 NOTE — PLAN OF CARE
Petty Hutchinson  July 21, 2025  Plan of Care Hand-off Note     Patient Recommended Care Path: observation    Clinical Substantiation:  Pt presented to the ED with increased anxiety and depression over the last two weeks. She has also begun experiencing intrusive suicidal thoughts within the last few days to crash her car or walk into traffic. She denies intent to act on these thoughts, identifying her mother as a strong protective factor. She reports significant emotional distress as a result of these thoughts. Pt would like to stay overnight on observation status at [facility] for further management of symptoms. She is interested in establishing care with a therapist and psychiatrist prior to discharge.    Goals for crisis stabilization:  decrease anxiety, depression, and suicidal ideation    Next steps for Care Team:  meet with therapy and psychiatry again tomorrow    Treatment Objectives Addressed:  processing feelings, assessing safety    Therapeutic Interventions:  Engaged in guided discovery, explored patient's perspectives and helped expand them through socratic dialogue.    Has a specific means been identified for suicidal.homicide actions: Yes  If yes, describe: Pt endorses intrusive suicidal thoughts to crash her car or walk into traffic.  Explain action steps toward mitigation: Pt will stay overnight on observation status at [facility] for further care.  Document completion of mitigation action: Pt will stay overnight on observation status at [facility] for further care.  The follow up action still needed prior to discharge: Pt will need to engage in safety planning prior to discharge.    Patient coping skills attempted to reduce the crisis:  Pt reached out to her mother for support today.    Collateral contact information:  Korin Patrick, mother, PH: [phone] (pt signed PEACE)    Legal Status: Voluntary/Patient has signed consent for treatment                   Reviewed court records: yes     Psychiatry Consult: No  psychiatry consult needed. Pt is at [facility].    Luciana Torres, EDDIESW

## 2025-07-22 NOTE — ED NOTES
Petty reports that she has been more depressed, her mind racing at night and unable to sleep well, she has been having suicidal thoughts but no plan, she will never act on those plans because her parents are important to her.   She denies any suicidal symptoms today, she started Lexapro last night .  She wants to go home this afternoon.

## 2025-07-22 NOTE — CONSULTS
Diagnostic Evaluation Consultation  Crisis Assessment    Patient Name: Petty Hutchinson  Age:  25 year old  Legal Sex: female  Gender Identity: female  Race: Black or   Ethnicity: Not  or   Language: English      Patient was assessed: In person   Crisis Assessment Start Date: 07/21/25  Crisis Assessment Start Time: 1713  Crisis Assessment Stop Time: 1733  Patient location: [facility] Emergency Dept                             [id number]    Referral Data and Chief Complaint  Petty Hutchinson presents to the ED by  self. Patient is presenting to the ED for the following concerns: Anxiety, Depression, Suicidal ideation. Factors that make the mental health crisis life threatening or complex are: Pt presents to the ED at the [facility] for increased anxiety, depression, and suicidal ideation over the last two weeks. 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 This has caused pt to start to spiral and worry about her future and life's purpose. She reports having over five panic attacks in the last week. Her racing thoughts have also disrupted her sleep. She estimates sleeping 4 hours/night with naps during the day. She has no concerns for appetite. Pt's anxiety has contributed to high levels of depressive symptoms. Pt currently endorses increased tearfulness, hopelessness, and passive suicidal ideation. She is tearful as she talks with this writer. She has been having intrusive suicidal thoughts to crash her car or walk into traffic. She does not want to act on these thoughts and identifies her family, particularly her mother, as protective factors against suicide. Pt has been increasingly using alcohol to cope with her mood symptoms. She has been going out with friends more on the weekends to drink and estimates drinking a pint of tequila by herself with additional  shots. She denies ever drinking alone and denies other substance use concerns.Today, pt could not stop crying at work and contacted [facility] who directed her to the ED. Pt has no established mental healthcare services at present..      Informed Consent and Assessment Methods  Explained the crisis assessment process, including applicable information disclosures and limits to confidentiality, assessed understanding of the process, and obtained consent to proceed with the assessment.  Assessment methods included conducting a formal interview with patient, review of medical records, collaboration with medical staff, and obtaining relevant collateral information from family and community providers when available.  : done     History of the Crisis   Pt shares that she feels like her depression started in 2024 after she had complications from gastric sleeve surgery, requiring hospital admission. She reports that her last depressive episode was a few months ago but that this has been her most intense depressive episode to date. She has no history of suicidal ideation prior to this current episode. Pt denies a history of NSSI or suicide attempts. Per collateral report, pt did receive therapy and psychiatry services as a child. Pt has a close laird with her mother, which is a strong protective factor for pt.    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.)  Source of Income:  salary/wages  Financial Environmental Concerns:  none  Current Hobbies:  group/social activities  Barriers in Personal Life:  mental health concerns, emotional concerns    Significant Clinical History  Current Anxiety Symptoms:  anxious, excessive worry, racing thoughts, panic attack, shortness of breath or racing heart  Current Depression/Trauma:  sense of doom, negativistic, crying or feels like  "crying, low self esteem, hopelessness, sadness, helplessness, thoughts of death/suicide (pt denies suicidal intent)  Current Somatic Symptoms:  anxious, shortness of breath or racing heart, excessive worry, racing thoughts  Current Psychosis/Thought Disturbance:     Current Eating Symptoms:     Chemical Use History:  Alcohol: Binge  Last Use:: 07/17/25  Benzodiazepines: None  Opiates: None  Cocaine: None  Marijuana: None  Other Use: None   Past diagnosis:  No known past diagnosis  Family history:  No known history of mental health or chemical health concerns  Past treatment:  Individual therapy, Psychiatric Medication Management  Details of most recent treatment:  Pt has no established mental healthcare providers at present.  Other relevant history:  No other relevant history.    Have there been any medication changes in the past two weeks:  patient is not on psychiatric meds       Is the patient compliant with medications:           Collateral Information  Is there collateral information: Yes     Collateral information name, relationship, phone number:  Korin Patrick, mother, PH: [phone]    What happened today: When Korin came home down, pt was in her bedroom in the dark crying and said that she was \"not okay\". This was suprising to Korin. Korin does think that pt's mood worsens when she is by herself.     What is different about patient's functioning: Korin thought that pt's mental health has been \"good\". When she was younger, she spoke to a therapist and psychiatrist. She has denied suicidal ideation when asked. No STEFANI concerns. She does drink alcohol socially.     What do you think the patient needs: Pt may benefit from therapy and medication management again.      Has patient made comments about wanting to kill themselves/others: no    If d/c is recommended, can they take part in safety/aftercare planning:  yes    Additional collateral information:  No additional colalteral information.     Risk " Assessment  [place] Suicide Severity Rating Scale Full Clinical Version: 7/21/25  Suicidal Ideation  Q1 Wish to be Dead (Lifetime): Yes  Q2 Non-Specific Active Suicidal Thoughts (Lifetime): Yes  3. Active Suicidal Ideation with any Methods (Not Plan) Without Intent to Act (Lifetime): Yes  4. Active Suicidal Ideation with Some Intent to Act, Without Specific Plan (Lifetime): No  5. Active Suicidal Ideation with Specific Plan and Intent (Lifetime): No  Q6 Suicide Behavior (Lifetime): no  Intensity of Ideation (Lifetime)  Most Severe Ideation Rating (Lifetime): 3  Frequency (Lifetime): Many times each day  Duration (Lifetime): Less than 1 hour/some of the time  Controllability (Lifetime): Can control thoughts with some difficulty  Deterrents (Lifetime): Deterrents probably stopped you  Reasons for Ideation (Lifetime): Completely to end or stop the pain (You couldn't go on living with the pain or how you were feeling)  Suicidal Behavior (Lifetime)  Actual Attempt (Lifetime): No  Has subject engaged in non-suicidal self-injurious behavior? (Lifetime): No  Interrupted Attempts (Lifetime): No  Aborted or Self-Interrupted Attempt (Lifetime): No  Preparatory Acts or Behavior (Lifetime): No    [place] Suicide Severity Rating Scale Recent: 7/21/25  Suicidal Ideation (Recent)  Q1 Wished to be Dead (Past Month): yes  Q2 Suicidal Thoughts (Past Month): yes  Q3 Suicidal Thought Method: yes  Q4 Suicidal Intent without Specific Plan: no  Q5 Suicide Intent with Specific Plan: no  Level of Risk per Screen: moderate risk  Intensity of Ideation (Recent)  Most Severe Ideation Rating (Past 1 Month): 3  Frequency (Past 1 Month): Many times each day  Duration (Past 1 Month): Less than 1 hour/some of the time  Controllability (Past 1 Month): Can control thoughts with some difficulty  Deterrents (Past 1 Month): Deterrents probably stopped you  Reasons for Ideation (Past 1 Month): Completely to end or stop the pain (You couldn't go on living  with the pain or how you were feeling)  Suicidal Behavior (Recent)  Actual Attempt (Past 3 Months): No  Total Number of Actual Attempts (Past 3 Months): 0  Has subject engaged in non-suicidal self-injurious behavior? (Past 3 Months): No  Interrupted Attempts (Past 3 Months): No  Total Number of Interrupted Attempts (Past 3 Months): 0  Aborted or Self-Interrupted Attempt (Past 3 Months): No  Total Number of Aborted or Self-Interrupted Attempts (Past 3 Months): 0  Preparatory Acts or Behavior (Past 3 Months): No  Total Number of Preparatory Acts (Past 3 Months): 0    Environmental or Psychosocial Events: helplessness/hopelessness  Protective Factors: Protective Factors: strong bond to family unit, community support, or employment, lives in a responsibly safe and stable environment, sense of importance of health and wellness, able to access care without barriers, help seeking    Does the patient have thoughts of harming others? Feels Like Hurting Others: no  Previous Attempt to Hurt Others: no  Is the patient engaging in sexually inappropriate behavior?: no  Does Patient have a known history of aggressive behavior: No  Has aggression occurred as a result of MH concerns/diagnosis: No.  Does patient have history of aggression in hospital: No.    Is the patient engaging in sexually inappropriate behavior?  no        Mental Status Exam   Affect: Blunted  Appearance: Appropriate  Attention Span/Concentration: Attentive  Eye Contact: Engaged    Fund of Knowledge: Appropriate   Language /Speech Content: Fluent  Language /Speech Volume: Normal  Language /Speech Rate/Productions: Normal  Recent Memory: Intact  Remote Memory: Intact  Mood: Anxious, Depressed, Sad  Orientation to Person: Yes   Orientation to Place: Yes  Orientation to Time of Day: Yes  Orientation to Date: Yes     Situation (Do they understand why they are here?): Yes  Psychomotor Behavior: Normal  Thought Content: Suicidal (pt denies suicidal intent)  Thought  Form: Obsessive/Perseverative     Medication  Psychotropic medications:   Medication Orders - Psychiatric (From admission, onward)      Start     Dose/Rate Route Frequency Ordered Stop    07/22/25 0800  escitalopram (LEXAPRO) tablet 5 mg         5 mg Oral DAILY 07/21/25 1759      07/21/25 1701  traZODone (DESYREL) tablet 50 mg         50 mg Oral AT BEDTIME PRN 07/21/25 1701      07/21/25 1701  OLANZapine zydis (zyPREXA) ODT tab 5-10 mg         5-10 mg Oral 3 TIMES DAILY PRN 07/21/25 1701      07/21/25 1701  hydrOXYzine HCl (ATARAX) tablet 25 mg         25 mg Oral EVERY 4 HOURS PRN 07/21/25 1701               Current Care Team  Patient Care Team:  Marjorie Knowles MD as PCP - General  Jerome Seals MD as Assigned Surgical Provider    Diagnosis  Patient Active Problem List   Diagnosis Code    Obesity E66.9    Abdominal pain R10.9    Post-op pain G89.18    Anxiety F41.9    Severe episode of recurrent major depressive disorder, without psychotic features (H) F33.2       Primary Problem This Admission  Active Hospital Problems    Anxiety F41.9      Severe episode of recurrent major depressive disorder, without psychotic features (H) F33.2    Clinical Summary and Substantiation of Recommendations   Clinical Substantiation:  Pt presented to the ED with increased anxiety and depression over the last two weeks. She has also begun experiencing intrusive suicidal thoughts within the last few days to crash her car or walk into traffic. She denies intent to act on these thoughts, identifying her mother as a strong protective factor. She reports significant emotional distress as a result of these thoughts. Pt would like to stay overnight on observation status at [facility] for further management of symptoms. She is interested in establishing care with a therapist and psychiatrist prior to discharge.    Goals for crisis stabilization:  decrease anxiety, depression, and suicidal ideation    Next steps for Care Team:  meet  with therapy and psychiatry again tomorrow    Treatment Objectives Addressed:  processing feelings, assessing safety    Therapeutic Interventions:  Engaged in guided discovery, explored patient's perspectives and helped expand them through socratic dialogue.    Has a specific means been identified for suicidal/homicide actions: Yes    If yes, describe:  Pt endorses intrusive suicidal thoughts to crash her car or walk into traffic.    Explain action steps toward mitigation:  Pt will stay overnight on observation [facility] at [facility] for further care.    Document completion of mitigation actions:  Pt will stay overnight on observation status at [facility] for further care.    The follow up action still needed prior to discharge:  Pt will need to engage in safety planning prior to discharge.    Patient coping skills attempted to reduce the crisis:  Pt reached out to her mother for support today.    Disposition  Recommended referrals: Individual Therapy, Medication Management        Reviewed case and recommendations with attending provider. Attending Name: Elenita Merida       Attending concurs with disposition: yes       Patient and/or validated legal guardian concurs with disposition:   yes       Final disposition:  observation       Legal status: Voluntary/Patient has signed consent for treatment                                                   Reviewed court records: yes       Assessment Details   Total duration spent with the patient: 20 min     CPT code(s) utilized: 01885 - Psychotherapy (with patient) - 30 (16-37*) min    SADE Ruby, Psychotherapist  [facility] - Triage & Transition Services  Callback: [phone]

## 2025-07-22 NOTE — ED PROVIDER NOTES
EmPATH Unit - Psychiatric Observation Discharge Summary  [facility] Emergency Department  Discharge Date: 7/22/2025    Petty Hutchinson MRN: [id number]   Age: 25 year old YOB: 2000     Brief HPI & Initial ED Course     Chief Complaint   Patient presents with    Suicidal    Depression     HPI  Petty Hutchinson is a 25 year old female with remote hx of ADHD and adjustment disorder who comes in with worsening depression and suicidal ideation. Medical hx notable for sleeve gastrectomy in 2023. 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 She is not sleeping very well and said that her mind races at night. She is not taking any psychiatric medications. She called the crisis line and [facility] and recommended she come here for further evaluation. In the emergency department, this patient was determined to be medically stable and transferred to the EmPATH unit for psychiatric assessment.  Record review indicates no prior inpatient mental health hospitalizations or suicide attempts.  Patient nearing 22 hours at the EMPATH unit.     Patient seen for followup today. No acute events overnight, noted to have slept well through the night in the sensory room. Patient is somewhat guarded during interview. She indicates no past psychiatric treatment. Has been feeling increasingly depressed recently, with low energy, low motivation with social withdrawal, anhedonia, trouble falling and staying asleep, poor appetite. These symptoms have worsened over the past two weeks to the point that patient had been experiencing thoughts of wanting to be dead. She currently denies suicidal thoughts, plans, or intent. Denies homicidal thinking. No evidence for psychosis. No hx of jeanette. Pt is tolerating the first dose of escitalopram. Provided psychoeducation on medications. She does briefly mention experiencing cold and numb hands and feet. Discussed this could be related to elevated anxiety. Patient feels safe to return home today.  "        Physical Examination   BP: 128/70  Pulse: 78  Temp: 97.2  F (36.2  C)  Resp: 18  Height: 177.8 cm (5' 10\")  Weight: 115.4 kg (254 lb 6.4 oz)  SpO2: 98 %    Physical Exam  General: Appears stated age.   Neuro: Alert and fully oriented. Extremities appear to demonstrate normal strength on visual inspection.   Integumentary/Skin: no rash visualized, normal color    Psychiatric Examination   Appearance: awake, alert, adequately groomed, and appeared as age stated  Attitude:  cooperative and guarded  Eye Contact:  fair  Mood:  depressed  Affect:  mood congruent and intensity is blunted  Speech:  clear, coherent and normal prosody  Psychomotor Behavior:  no evidence of tardive dyskinesia, dystonia, or tics  Thought Process:  linear  Associations:  no loose associations  Thought Content:  no evidence of suicidal ideation or homicidal ideation and no evidence of psychotic thought  Insight:  fair  Judgement:  fair  Oriented to:  time, person, and place  Attention Span and Concentration:  fair  Recent and Remote Memory:  intact  Language: able to name/identify objects without impairment  Fund of Knowledge: intact with awareness of current and past events    Results        Labs Ordered and Resulted from Time of ED Arrival to Time of ED Departure - No data to display    Observation Course   The patient was found to have a psychiatric condition that would benefit from an observation stay in the emergency department for further psychiatric stabilization and/or coordination of a safe disposition. The plan upon observation admission included serial assessments of psychiatric condition, potential administration of medications if indicated, further disposition pending the patient's psychiatric course during the monitoring period.     Serial assessments of the patient's psychiatric condition were performed. Nursing notes were reviewed. During the observation period, the patient did not require medications for agitation, and " did not require restraints/seclusion for patient and/or provider safety.     After a period of working with the treatment team on the EmPATH unit, the patient's mental state improved to allow a safe transition to outpatient care. After counseling on the diagnosis, work-up, and treatment plan, the patient was discharged. Close follow-up with a psychiatrist and/or therapist was recommended and community psychiatric resources were provided. Patient is to return to the ED if any urgent or potentially life-threatening concerns.     Discharge Diagnoses:   Final diagnoses:   Severe episode of recurrent major depressive disorder, without psychotic features (H)   Anxiety       Treatment Plan:  - Prescribe Lexapro 5 mg daily for treatment of anxiety and depressive symptoms  - Prescribe trazodone 50 mg at bedtime prn for sleep  - Prescribe hydroxyzine 50 mg TID PRN for anxiety  - Patient will be provided with a referral for primary care in addition to appointments for psychiatric medication management and individual psychotherapy.   - Urine drug screen and/or hcg were not collected by time of discharge  - Patient will be discharged home today      At the time of discharge, the patient's acute suicide risk was determined to be low due to the following factors: Reduction in the intensity of mood/anxiety symptoms that preceded the admission, denial of suicidal thoughts, denies feeling helpless or hopeless, not currently under the influence of alcohol or illicit substances, denies experiencing command hallucinations, no immediate access to firearms. The patient's acute risk could be higher if noncompliant with their treatment plan, medications, follow-up appointments or using illicit substances or alcohol. Protective factors include: social supports, stable housing    I spent more than 30 minutes on discharge day activities.    --  TOMY Rosas [facility] EMERGENCY DEPT  EmPATH Unit      Franky  TOMY Steward CNP  07/22/25 5433

## 2025-07-22 NOTE — ED NOTES
Patient slept the entire night in [facility] with no signs of distress. Will be reassessed later this morning. Will continue to monitor.

## 2025-07-23 ENCOUNTER — OFFICE VISIT (OUTPATIENT)
Dept: FAMILY MEDICINE | Facility: CLINIC | Age: 25
End: 2025-07-23
Attending: NURSE PRACTITIONER
Payer: COMMERCIAL

## 2025-07-23 VITALS
SYSTOLIC BLOOD PRESSURE: 109 MMHG | HEART RATE: 58 BPM | WEIGHT: 254.38 LBS | OXYGEN SATURATION: 100 % | RESPIRATION RATE: 16 BRPM | BODY MASS INDEX: 36.5 KG/M2 | DIASTOLIC BLOOD PRESSURE: 74 MMHG | TEMPERATURE: 97.3 F

## 2025-07-23 DIAGNOSIS — Z12.4 CERVICAL CANCER SCREENING: ICD-10-CM

## 2025-07-23 DIAGNOSIS — E66.9 OBESITY: ICD-10-CM

## 2025-07-23 DIAGNOSIS — F41.9 ANXIETY: ICD-10-CM

## 2025-07-23 DIAGNOSIS — F33.2 SEVERE EPISODE OF RECURRENT MAJOR DEPRESSIVE DISORDER, WITHOUT PSYCHOTIC FEATURES (H): ICD-10-CM

## 2025-07-23 DIAGNOSIS — Z11.3 SCREENING FOR STDS (SEXUALLY TRANSMITTED DISEASES): Primary | ICD-10-CM

## 2025-07-23 PROCEDURE — 3074F SYST BP LT 130 MM HG: CPT

## 2025-07-23 PROCEDURE — 1126F AMNT PAIN NOTED NONE PRSNT: CPT

## 2025-07-23 PROCEDURE — 3078F DIAST BP <80 MM HG: CPT

## 2025-07-23 PROCEDURE — 99203 OFFICE O/P NEW LOW 30 MIN: CPT

## 2025-07-23 ASSESSMENT — PAIN SCALES - GENERAL: PAINLEVEL_OUTOF10: NO PAIN (0)

## 2025-07-23 ASSESSMENT — PATIENT HEALTH QUESTIONNAIRE - PHQ9
10. IF YOU CHECKED OFF ANY PROBLEMS, HOW DIFFICULT HAVE THESE PROBLEMS MADE IT FOR YOU TO DO YOUR WORK, TAKE CARE OF THINGS AT HOME, OR GET ALONG WITH OTHER PEOPLE: VERY DIFFICULT
SUM OF ALL RESPONSES TO PHQ QUESTIONS 1-9: 19
SUM OF ALL RESPONSES TO PHQ QUESTIONS 1-9: 19

## 2025-07-23 NOTE — PROGRESS NOTES
{PROVIDER CHARTING PREFERENCE:323892}    Subjective   Petty is a 25 year old, presenting for the following health issues:  Hospital F/U and Establish Care    Via the Health Maintenance questionnaire, the patient has reported the following services have been completed -[facility], this information has been sent to the abstraction team.  [facility]      Hospital Follow-up Visit:    Hospital/Nursing Home/IP Rehab Facility: [facility]  Most Recent Admission Date: 7/21/2025   Most Recent Admission Diagnosis:      Most Recent Discharge Date: 7/22/2025   Most Recent Discharge Diagnosis: Severe episode of recurrent major depressive disorder, without psychotic features (H) - F33.2  Anxiety - F41.9  Obesity - E66.9   Do you have any other stressors you would like to discuss with your provider? No    Problems taking medications regularly:  None  Medication changes since discharge: None  Problems adhering to non-medication therapy:  None    Summary of hospitalization:  [facility] discharge summary reviewed  Diagnostic Tests/Treatments reviewed.  Follow up needed: Medications added to regimen and establish care with a PCP  Other Healthcare Providers Involved in Patient s Care:         None  Update since discharge: improved.     Petty was seen at [facility] yesterday.   Intake at [facility] today, saw a therapist. Mom is supportive.   Plan of care communicated with patient  Monday reports that she couldn't stop crying, did have SI, none currently. Lives with her parents, who have been supportive and are checking in.     Review of Systems  Constitutional, HEENT, cardiovascular, pulmonary, gi and gu systems are negative, except as otherwise noted.      Objective    /74 (BP Location: Left arm, Patient Position: Sitting, Cuff Size: Adult Large)   Pulse 58   Temp 97.3  F (36.3  C) (Temporal)   Resp 16   Wt 115.4 kg (254 lb 6 oz)   LMP 07/15/2025 (Within Days)    SpO2 100%   BMI 36.50 kg/m    Body mass index is 36.5 kg/m .    Physical Exam   {Exam List (Optional):898294}    {Diagnostic Test Results (Optional):971494}      Signed Electronically by: TOMY Aguilera CNP

## 2025-07-23 NOTE — PATIENT INSTRUCTIONS
At [facility], we strive to deliver an exceptional experience to you, every time we see you. If you receive a survey, please let us know what we are doing well and/or what we could improve upon, as we do value your feedback.  If you have MyChart, you can expect to receive results automatically within 24 hours of their completion.  Your provider will send a note interpreting your results as well.   If you do not have MyChart, you should receive your results in about a week by mail.    Your care team:                            Family Medicine Internal Medicine   MD Anthony Pacheco, MD Zoila Reynoso, MD Samuel Oconnor, MD Chante Collins, PA-C TOMY Gooden, ODIN Craft, MD Pediatrics   MD Amanda Orozco, MD Casie Gomez, PASOURAV Ruiz APRN CNP   MD Herlinda Bajwa, MD Niecy Parekh, CNP      Same-Day Provider (No follow-up visits)    KASSANDRA Anand PA-C     Clinic hours: Monday - Thursday 7 am-6 pm; Fridays 7 am-5 pm.   Urgent care: Monday - Friday 10 am- 8 pm; Saturday and Sunday 9 am-5 pm.    Clinic: [phone]       [facility]: Monday - Thursday 8 am - 7 pm; Friday 8 am - 6 pm  [facility]: [phone]     [facility] Scheduling: Monday - Friday 7 am - 7 pm; Saturday 7 am - 3:30 pm  [phone] [place] : [phone]

## 2025-07-24 ENCOUNTER — PATIENT OUTREACH (OUTPATIENT)
Dept: CARE COORDINATION | Facility: CLINIC | Age: 25
End: 2025-07-24
Payer: COMMERCIAL

## 2025-07-24 NOTE — PROGRESS NOTES
Connected Care Resource Center: [facility]    Background: Transitional Care Management program identified per system criteria and reviewed by [facility] team for possible outreach.    Assessment: Upon chart review, CCR Team member will not proceed with patient outreach related to this episode of Transitional Care Management program due to reason below:    Patient has active communication with a nurse, provider or care team for reason of post-hospital follow up plan.  Outreach call by [facility] team not indicated to minimize duplicative efforts.     Plan: Transitional Care Management episode addressed appropriately per reason noted above.      RODRIGO Nunez  [facility] [place]    *Connected Care Resource Team does NOT follow patient ongoing. Referrals are identified based on internal discharge reports and the outreach is to ensure patient has an understanding of their discharge instructions.

## 2025-08-02 ENCOUNTER — HEALTH MAINTENANCE LETTER (OUTPATIENT)
Age: 25
End: 2025-08-02

## (undated) DEVICE — LABEL MEDICATION SYSTEM 3303-P

## (undated) DEVICE — TUBING SUCTION 10'X3/16" N510

## (undated) DEVICE — SUCTION MANIFOLD NEPTUNE 2 SYS 4 PORT 0702-020-000

## (undated) DEVICE — JELLY LUBRICATING SURGILUBE 2OZ TUBE

## (undated) DEVICE — ENDO FCP GRASPING ROTATABLE 7.2MMX2.6MM FG-244NR

## (undated) DEVICE — BITE BLOCK BLOX ADJ STRAP 60FR SBT-546-100

## (undated) DEVICE — WIRE GUIDE AMPLATZ SUPER STIFF 0.035"X260CM M001465261

## (undated) DEVICE — ENDO CAP AND TUBING STERILE FOR ENDOGATOR  100130

## (undated) DEVICE — KIT ENDO FIRST STEP DISINFECTANT 200ML W/POUCH EP-4

## (undated) DEVICE — SPONGE RAY-TEC 4X8" 7318

## (undated) DEVICE — PITCHER STERILE 1000ML  SSK9004A

## (undated) DEVICE — SYR 30ML SLIP TIP W/O NDL 302833

## (undated) DEVICE — KIT CONNECTOR FOR OLYMPUS ENDOSCOPES DEFENDO 100310

## (undated) DEVICE — SOL WATER IRRIG 1000ML BOTTLE 2F7114

## (undated) RX ORDER — FENTANYL CITRATE 50 UG/ML
INJECTION, SOLUTION INTRAMUSCULAR; INTRAVENOUS
Status: DISPENSED
Start: 2024-02-16

## (undated) RX ORDER — PROPOFOL 10 MG/ML
INJECTION, EMULSION INTRAVENOUS
Status: DISPENSED
Start: 2024-03-26

## (undated) RX ORDER — HYDROMORPHONE HCL IN WATER/PF 6 MG/30 ML
PATIENT CONTROLLED ANALGESIA SYRINGE INTRAVENOUS
Status: DISPENSED
Start: 2024-03-26

## (undated) RX ORDER — ONDANSETRON 2 MG/ML
INJECTION INTRAMUSCULAR; INTRAVENOUS
Status: DISPENSED
Start: 2024-03-26

## (undated) RX ORDER — ONDANSETRON 2 MG/ML
INJECTION INTRAMUSCULAR; INTRAVENOUS
Status: DISPENSED
Start: 2024-03-13

## (undated) RX ORDER — ONDANSETRON 2 MG/ML
INJECTION INTRAMUSCULAR; INTRAVENOUS
Status: DISPENSED
Start: 2024-03-29

## (undated) RX ORDER — FENTANYL CITRATE 50 UG/ML
INJECTION, SOLUTION INTRAMUSCULAR; INTRAVENOUS
Status: DISPENSED
Start: 2024-03-29

## (undated) RX ORDER — ACETAMINOPHEN 325 MG/1
TABLET ORAL
Status: DISPENSED
Start: 2024-03-29

## (undated) RX ORDER — SODIUM CHLORIDE, SODIUM LACTATE, POTASSIUM CHLORIDE, CALCIUM CHLORIDE 600; 310; 30; 20 MG/100ML; MG/100ML; MG/100ML; MG/100ML
INJECTION, SOLUTION INTRAVENOUS
Status: DISPENSED
Start: 2024-03-26

## (undated) RX ORDER — FENTANYL CITRATE 50 UG/ML
INJECTION, SOLUTION INTRAMUSCULAR; INTRAVENOUS
Status: DISPENSED
Start: 2024-03-13

## (undated) RX ORDER — NALOXONE HYDROCHLORIDE 0.4 MG/ML
INJECTION, SOLUTION INTRAMUSCULAR; INTRAVENOUS; SUBCUTANEOUS
Status: DISPENSED
Start: 2024-03-13

## (undated) RX ORDER — SCOLOPAMINE TRANSDERMAL SYSTEM 1 MG/1
PATCH, EXTENDED RELEASE TRANSDERMAL
Status: DISPENSED
Start: 2024-03-29

## (undated) RX ORDER — FENTANYL CITRATE 50 UG/ML
INJECTION, SOLUTION INTRAMUSCULAR; INTRAVENOUS
Status: DISPENSED
Start: 2024-03-26

## (undated) RX ORDER — CEFAZOLIN SODIUM/WATER 2 G/20 ML
SYRINGE (ML) INTRAVENOUS
Status: DISPENSED
Start: 2024-03-26